# Patient Record
Sex: MALE | Race: BLACK OR AFRICAN AMERICAN | Employment: FULL TIME | URBAN - METROPOLITAN AREA
[De-identification: names, ages, dates, MRNs, and addresses within clinical notes are randomized per-mention and may not be internally consistent; named-entity substitution may affect disease eponyms.]

---

## 2018-04-05 ENCOUNTER — TELEPHONE (OUTPATIENT)
Dept: INTERNAL MEDICINE CLINIC | Age: 48
End: 2018-04-05

## 2018-04-05 NOTE — TELEPHONE ENCOUNTER
Called and spoke to the pt regarding fax sent over from [employer] requesting creams. He said he is requesting this he would like something to use for his carpal tunnel pain in both hands. Informed him I was not sure if MD would fill this as He has not been seen since 2015 for an ov.he states he has just been very busy working and planning his upcoming wedding. I got pt scheduled for a routine f/u apt for wed 4/18 @ 5:05pm forms have been placed in your folder on your desk for review.

## 2018-04-18 ENCOUNTER — OFFICE VISIT (OUTPATIENT)
Dept: INTERNAL MEDICINE CLINIC | Age: 48
End: 2018-04-18

## 2018-04-18 VITALS
HEART RATE: 78 BPM | WEIGHT: 149.6 LBS | TEMPERATURE: 97.9 F | OXYGEN SATURATION: 94 % | SYSTOLIC BLOOD PRESSURE: 115 MMHG | BODY MASS INDEX: 23.48 KG/M2 | HEIGHT: 67 IN | DIASTOLIC BLOOD PRESSURE: 81 MMHG | RESPIRATION RATE: 16 BRPM

## 2018-04-18 DIAGNOSIS — F33.1 MODERATE EPISODE OF RECURRENT MAJOR DEPRESSIVE DISORDER (HCC): Primary | ICD-10-CM

## 2018-04-18 DIAGNOSIS — Z12.5 SCREENING FOR PROSTATE CANCER: ICD-10-CM

## 2018-04-18 DIAGNOSIS — Z00.00 ROUTINE GENERAL MEDICAL EXAMINATION AT A HEALTH CARE FACILITY: ICD-10-CM

## 2018-04-18 DIAGNOSIS — R05.3 CHRONIC COUGH: ICD-10-CM

## 2018-04-18 DIAGNOSIS — M25.50 GENERALIZED JOINT PAIN: ICD-10-CM

## 2018-04-18 RX ORDER — MONTELUKAST SODIUM 10 MG/1
10 TABLET ORAL DAILY
Qty: 30 TAB | Refills: 3 | Status: SHIPPED | OUTPATIENT
Start: 2018-04-18 | End: 2019-03-28

## 2018-04-18 RX ORDER — ESCITALOPRAM OXALATE 10 MG/1
10 TABLET ORAL DAILY
Qty: 30 TAB | Refills: 1 | Status: SHIPPED | OUTPATIENT
Start: 2018-04-18 | End: 2018-06-15 | Stop reason: SDUPTHER

## 2018-04-18 RX ORDER — ACETAMINOPHEN 500 MG
500 TABLET ORAL
COMMUNITY
End: 2021-01-11

## 2018-04-18 NOTE — PROGRESS NOTES
HISTORY OF PRESENT ILLNESS  Matthias Noe is a 49 y.o. male. JUANCARLOS Lofton is seen today after a long absence for follow up of joint pain and depression with other concerns. 1.  Depression. This peaked last October with a suicide attempt. He locked himself in his garage with the automobile engine running. His fiancee who lives across the street from his shop noted something was amiss and kicked the door down. He suffered no sequelae from this incident and did not present for hospital evaluation. He still feels depressed but has had no further suicide attempts or suicidal ideation. The primary source of his depression is stress with his ex-wife which has led to not being able to see the children. This has been very difficult for him. We talked about medication treatment and he agrees to a trial of medication. 2.  Joint pain. I had received a request from a mail order pharmacy regarding compounded topical medications. I have asked him to check with his insurance before I send this as I am worried he would get stuck with the cost. He will let me know. 3.  Chronic sinus congestion. Reviewed use of Singulair. Social History: Notable for him being engaged to be . This has been a very bright point for him. Review of Systems   HENT: Positive for congestion. Respiratory: Positive for cough. Musculoskeletal: Positive for joint pain. Psychiatric/Behavioral: Positive for depression. Physical Exam   Constitutional: No distress. Cardiovascular: Normal rate and regular rhythm. Exam reveals no gallop and no friction rub. No murmur heard. Pulmonary/Chest: Effort normal and breath sounds normal.   Skin: Skin is warm and dry. Psychiatric: He has a normal mood and affect. His behavior is normal.   Nursing note and vitals reviewed. ASSESSMENT and PLAN  Diagnoses and all orders for this visit:    1.  Moderate episode of recurrent major depressive disorder (HCC)  - escitalopram oxalate (LEXAPRO) 10 mg tablet; Take 1 Tab by mouth daily. 2. Chronic cough  -     montelukast (SINGULAIR) 10 mg tablet; Take 1 Tab by mouth daily. Indications: chronic cough    3. Generalized joint pain- see hpi    4. Screening for prostate cancer  -     PSA SCREENING (SCREENING) ()    5.  Routine general medical examination at a health care facility  -     LIPID PANEL  -     TSH 3RD GENERATION  -     METABOLIC PANEL, COMPREHENSIVE  -     CBC WITH AUTOMATED DIFF  -     URINALYSIS W/ RFLX MICROSCOPIC

## 2018-04-18 NOTE — MR AVS SNAPSHOT
[address] [facility] [address] [place] [address] 
[phone] Patient: Leny Mirza MRN: [id number] :[id number] Visit Information Date & Time Provider Department Dept. Phone Encounter #  
 2018  5:05 PM Debby Xavier, [address] [place] [address] Internal Medicine [phone] [id number] Follow-up Instructions Return in about 1 month (around 2018). Upcoming Health Maintenance Date Due Influenza Age 5 to Adult 2018* DTaP/Tdap/Td series (2 - Td) 1/3/2022 COLONOSCOPY 2026 *Topic was postponed. The date shown is not the original due date. Allergies as of 2018  Review Complete On: 2018 By: Debby Xavier MD  
 No Known Allergies Current Immunizations  Reviewed on 2014 Name Date TDAP Vaccine 1/3/2012 Not reviewed this visit You Were Diagnosed With   
  
 Codes Comments Moderate episode of recurrent major depressive disorder ([facility]    -  Primary ICD-10-CM: F33.1 ICD-9-CM: 296.32 Chronic cough     ICD-10-CM: R05 ICD-9-CM: 786.2 Generalized joint pain     ICD-10-CM: M25.50 ICD-9-CM: 719.40 Screening for prostate cancer     ICD-10-CM: Z12.5 ICD-9-CM: V76.44 Routine general medical examination at a health care facility     ICD-10-CM: Z00.00 ICD-9-CM: V70.0 Vitals BP Pulse Temp Resp Height(growth percentile) Weight(growth percentile) 115/81 (BP 1 Location: Left arm, BP Patient Position: Sitting) 78 97.9 °F (36.6 °C) (Oral) 16 5' 7\" (1.702 m) 149 lb 9.6 oz (67.9 kg) SpO2 BMI Smoking Status 94% 23.43 kg/m2 Never Smoker BMI and BSA Data Body Mass Index Body Surface Area  
 23.43 kg/m 2 1.79 m 2 Preferred Pharmacy Pharmacy Name Phone [facility] [address] [facility], [facility] [address] [address] [address] [facility] [phone] Your Updated Medication List  
  
   
 This list is accurate as of 4/18/18  6:11 PM.  Always use your most recent med list.  
  
  
  
  
 dicyclomine 10 mg capsule Commonly known as:  BENTYL Take 1 Cap by mouth four (4) times daily as needed (abdominal pain). escitalopram oxalate 10 mg tablet Commonly known as:  [place] Aas Take 1 Tab by mouth daily. montelukast 10 mg tablet Commonly known as:  SINGULAIR Take 1 Tab by mouth daily. Indications: chronic cough  
  
 multivitamin tablet Commonly known as:  ONE A DAY Take 1 Tab by mouth daily. omeprazole 40 mg capsule Commonly known as:  PRILOSEC Take 1 Cap by mouth daily. sildenafil citrate 100 mg tablet Commonly known as:  VIAGRA Take 1 Tab by mouth as needed. TYLENOL EXTRA STRENGTH 500 mg tablet Generic drug:  acetaminophen Take  by mouth. Prescriptions Sent to Pharmacy Refills  
 escitalopram oxalate (LEXAPRO) 10 mg tablet 1 Sig: Take 1 Tab by mouth daily. Class: Normal  
 Pharmacy: [facility] [address] [facility] Ph #: [phone] Route: Oral  
 montelukast (SINGULAIR) 10 mg tablet 3 Sig: Take 1 Tab by mouth daily. Indications: chronic cough Class: Normal  
 Pharmacy: [facility] [address] [facility] Ph #: [phone] Route: Oral  
  
We Performed the Following CBC WITH AUTOMATED DIFF [04581 CPT(R)] LIPID PANEL [56679 CPT(R)] METABOLIC PANEL, COMPREHENSIVE [10167 CPT(R)] PSA SCREENING (SCREENING) [ [facility]] TSH 3RD GENERATION [24437 CPT(R)] URINALYSIS W/ RFLX MICROSCOPIC [81976 CPT(R)] Follow-up Instructions Return in about 1 month (around 5/18/2018). Introducing [facility]!    
 [employer] introduces [employer] patient portal. Now you can access parts of your medical record, email your doctor's office, and request medication refills online. 1. In your internet browser, go to [email or web address]. [employer] [employer] 2. Click on the First Time User? Click Here link in the Sign In box. You will see the New Member Sign Up page. 3. Enter your [employer] Access Code exactly as it appears below. You will not need to use this code after youve completed the sign-up process. If you do not sign up before the expiration date, you must request a new code. · [employer] Access Code: [email or web address] Expires: 7/17/2018  5:15 PM 
 
4. Enter the last four digits of your Social Security Number (xxxx) and Date of Birth (mm/dd/yyyy) as indicated and click Submit. You will be taken to the next sign-up page. 5. Create a [employer]ix ID. This will be your [employer] login ID and cannot be changed, so think of one that is secure and easy to remember. 6. Create a [employer] password. You can change your password at any time. 7. Enter your Password Reset Question and Answer. This can be used at a later time if you forget your password. 8. Enter your e-mail address. You will receive e-mail notification when new information is available in [address] 9. Click Sign Up. You can now view and download portions of your medical record. 10. Click the Download Summary menu link to download a portable copy of your medical information. If you have questions, please visit the Frequently Asked Questions section of the [employer] website. Remember, [employer] is NOT to be used for urgent needs. For medical emergencies, dial 911. Now available from your iPhone and Android! Please provide this summary of care documentation to your next provider. Your primary care clinician is listed as DIPIKA SORIANO. If you have any questions after today's visit, please call [phone].

## 2018-05-15 LAB
ALBUMIN SERPL-MCNC: 4.7 G/DL (ref 3.5–5.5)
ALBUMIN/GLOB SERPL: 2 {RATIO} (ref 1.2–2.2)
ALP SERPL-CCNC: 55 IU/L (ref 39–117)
ALT SERPL-CCNC: 21 IU/L (ref 0–44)
APPEARANCE UR: CLEAR
AST SERPL-CCNC: 24 IU/L (ref 0–40)
BASOPHILS # BLD AUTO: 0 X10E3/UL (ref 0–0.2)
BASOPHILS NFR BLD AUTO: 0 %
BILIRUB SERPL-MCNC: 0.4 MG/DL (ref 0–1.2)
BILIRUB UR QL STRIP: NEGATIVE
BUN SERPL-MCNC: 15 MG/DL (ref 6–24)
BUN/CREAT SERPL: 13 (ref 9–20)
CALCIUM SERPL-MCNC: 9 MG/DL (ref 8.7–10.2)
CHLORIDE SERPL-SCNC: 106 MMOL/L (ref 96–106)
CHOLEST SERPL-MCNC: 193 MG/DL (ref 100–199)
CO2 SERPL-SCNC: 25 MMOL/L (ref 18–29)
COLOR UR: YELLOW
CREAT SERPL-MCNC: 1.17 MG/DL (ref 0.76–1.27)
EOSINOPHIL # BLD AUTO: 0.1 X10E3/UL (ref 0–0.4)
EOSINOPHIL NFR BLD AUTO: 3 %
ERYTHROCYTE [DISTWIDTH] IN BLOOD BY AUTOMATED COUNT: 15 % (ref 12.3–15.4)
GFR SERPLBLD CREATININE-BSD FMLA CKD-EPI: 73 ML/MIN/1.73
GFR SERPLBLD CREATININE-BSD FMLA CKD-EPI: 85 ML/MIN/1.73
GLOBULIN SER CALC-MCNC: 2.3 G/DL (ref 1.5–4.5)
GLUCOSE SERPL-MCNC: 100 MG/DL (ref 65–99)
GLUCOSE UR QL: NEGATIVE
HCT VFR BLD AUTO: 41.6 % (ref 37.5–51)
HDLC SERPL-MCNC: 74 MG/DL
HGB BLD-MCNC: 14.1 G/DL (ref 13–17.7)
HGB UR QL STRIP: NEGATIVE
IMM GRANULOCYTES # BLD: 0 X10E3/UL (ref 0–0.1)
IMM GRANULOCYTES NFR BLD: 0 %
KETONES UR QL STRIP: NEGATIVE
LDLC SERPL CALC-MCNC: 107 MG/DL (ref 0–99)
LEUKOCYTE ESTERASE UR QL STRIP: NEGATIVE
LYMPHOCYTES # BLD AUTO: 1.5 X10E3/UL (ref 0.7–3.1)
LYMPHOCYTES NFR BLD AUTO: 44 %
MCH RBC QN AUTO: 29.1 PG (ref 26.6–33)
MCHC RBC AUTO-ENTMCNC: 33.9 G/DL (ref 31.5–35.7)
MCV RBC AUTO: 86 FL (ref 79–97)
MICRO URNS: NORMAL
MONOCYTES # BLD AUTO: 0.3 X10E3/UL (ref 0.1–0.9)
MONOCYTES NFR BLD AUTO: 8 %
NEUTROPHILS # BLD AUTO: 1.5 X10E3/UL (ref 1.4–7)
NEUTROPHILS NFR BLD AUTO: 45 %
NITRITE UR QL STRIP: NEGATIVE
PH UR STRIP: 6.5 [PH] (ref 5–7.5)
PLATELET # BLD AUTO: 204 X10E3/UL (ref 150–379)
POTASSIUM SERPL-SCNC: 4.8 MMOL/L (ref 3.5–5.2)
PROT SERPL-MCNC: 7 G/DL (ref 6–8.5)
PROT UR QL STRIP: NEGATIVE
PSA SERPL-MCNC: 0.5 NG/ML (ref 0–4)
RBC # BLD AUTO: 4.85 X10E6/UL (ref 4.14–5.8)
SODIUM SERPL-SCNC: 143 MMOL/L (ref 134–144)
SP GR UR: 1.02 (ref 1–1.03)
TRIGL SERPL-MCNC: 61 MG/DL (ref 0–149)
TSH SERPL DL<=0.005 MIU/L-ACNC: 1.03 UIU/ML (ref 0.45–4.5)
UROBILINOGEN UR STRIP-MCNC: 0.2 MG/DL (ref 0.2–1)
VLDLC SERPL CALC-MCNC: 12 MG/DL (ref 5–40)
WBC # BLD AUTO: 3.4 X10E3/UL (ref 3.4–10.8)

## 2018-05-15 NOTE — PROGRESS NOTES
[facility] eBto spoke with KYLE crawford on A1C - dx IFG (R73.01).

## 2018-05-16 LAB
HBA1C MFR BLD: 5.7 % (ref 4.8–5.6)
SPECIMEN STATUS REPORT, ROLRST: NORMAL

## 2019-03-18 ENCOUNTER — TELEPHONE (OUTPATIENT)
Dept: INTERNAL MEDICINE CLINIC | Age: 49
End: 2019-03-18

## 2019-03-18 NOTE — TELEPHONE ENCOUNTER
Pt was told by Dr Margarito Judge -to have the ColoGurard done -   Pt needs Dr to order it for him to do at home.   Advise - [phone]

## 2019-03-19 ENCOUNTER — TELEPHONE (OUTPATIENT)
Dept: INTERNAL MEDICINE CLINIC | Age: 49
End: 2019-03-19

## 2019-03-19 NOTE — TELEPHONE ENCOUNTER
Noted pt dropped off schedule today. Spoke with Alley Ravi who took call from wife - stated pt was admitted to hospital. Asked where? She did not ask. Called pt's number again to see where he was admitted. His wife Matt Link answered. She states pt went to [facility] yesterday. Was admitted for possible ulcer. MD feels he may have a bleeding ulcer. He is scheduled for scope today.  Will forward to MD.

## 2019-03-19 NOTE — TELEPHONE ENCOUNTER
Attempted to call pt left message that he was to go to ER yesterday. I did [facility] see [facility] He was scheduled for appt today at 9:20 am. Also attempted to call pt's wife Holly Jameson and received message caller was not accepting calls at this time.  Will forward to MD.

## 2019-03-28 ENCOUNTER — HOSPITAL ENCOUNTER (OUTPATIENT)
Dept: GENERAL RADIOLOGY | Age: 49
Discharge: HOME | End: 2019-03-28
Payer: COMMERCIAL

## 2019-03-28 ENCOUNTER — OFFICE VISIT (OUTPATIENT)
Dept: INTERNAL MEDICINE CLINIC | Age: 49
End: 2019-03-28

## 2019-03-28 VITALS
BODY MASS INDEX: 22.48 KG/M2 | HEART RATE: 95 BPM | HEIGHT: 67 IN | DIASTOLIC BLOOD PRESSURE: 86 MMHG | TEMPERATURE: 98.5 F | SYSTOLIC BLOOD PRESSURE: 124 MMHG | WEIGHT: 143.25 LBS | OXYGEN SATURATION: 98 % | RESPIRATION RATE: 18 BRPM

## 2019-03-28 DIAGNOSIS — I26.99 OTHER ACUTE PULMONARY EMBOLISM WITHOUT ACUTE COR PULMONALE (HCC): ICD-10-CM

## 2019-03-28 DIAGNOSIS — R05.9 COUGH: ICD-10-CM

## 2019-03-28 DIAGNOSIS — T14.91XA SUICIDE ATTEMPT (HCC): Primary | ICD-10-CM

## 2019-03-28 DIAGNOSIS — K92.2 GASTROINTESTINAL HEMORRHAGE, UNSPECIFIED GASTROINTESTINAL HEMORRHAGE TYPE: ICD-10-CM

## 2019-03-28 DIAGNOSIS — F33.2 SEVERE EPISODE OF RECURRENT MAJOR DEPRESSIVE DISORDER, WITHOUT PSYCHOTIC FEATURES (HCC): ICD-10-CM

## 2019-03-28 LAB
INR BLD: 1.1
PT POC: 13.1 SECONDS
VALID INTERNAL CONTROL?: YES

## 2019-03-28 PROCEDURE — 71046 X-RAY EXAM CHEST 2 VIEWS: CPT

## 2019-03-28 RX ORDER — LANOLIN ALCOHOL/MO/W.PET/CERES
325 CREAM (GRAM) TOPICAL
COMMUNITY
Start: 2019-03-21 | End: 2019-04-30 | Stop reason: SDUPTHER

## 2019-03-28 RX ORDER — PANTOPRAZOLE SODIUM 40 MG/1
TABLET, DELAYED RELEASE ORAL
Refills: 1 | COMMUNITY
Start: 2019-03-21 | End: 2019-06-26 | Stop reason: SDUPTHER

## 2019-03-28 RX ORDER — ENOXAPARIN SODIUM 100 MG/ML
60 INJECTION SUBCUTANEOUS EVERY 12 HOURS
Qty: 14 SYRINGE | Refills: 5 | Status: SHIPPED | OUTPATIENT
Start: 2019-03-28 | End: 2019-04-09 | Stop reason: ALTCHOICE

## 2019-03-28 RX ORDER — WARFARIN SODIUM 5 MG/1
TABLET ORAL
Refills: 0 | COMMUNITY
Start: 2019-03-21 | End: 2019-04-04 | Stop reason: SDUPTHER

## 2019-03-28 RX ORDER — MIRTAZAPINE 15 MG/1
TABLET, FILM COATED ORAL
Refills: 0 | COMMUNITY
Start: 2019-03-14 | End: 2019-04-04 | Stop reason: SDUPTHER

## 2019-03-28 NOTE — PROGRESS NOTES
HISTORY OF PRESENT ILLNESS Jhony Saldaña is a 51 y.o. male. JUANCARLOS Sales is seen today accompanied by his wife for followup of recent hospitalizations x3. This is not a transition of care visit given absence of nurse navigator contact as well as lack of coverage of this service by his insurance. He was admitted to [facility] on 3/9/19 with an overdose attempt. He was treated in the ICU and then transferred for inpatient psychiatry there. He was readmitted to [facility] on 3/14/19 through 3/16/19 with right chest pain and flank pain. He was diagnosed with a pulmonary embolism. He was started on Eliquis. On 3/18/19, he developed signs of GI bleeding and was referred back to the hospital.  He went [place] [facility] [place] time. We requested and reviewed the available records. He was discharged on 3/21/19. He did not have a colonoscopy and has actually had this performed in the fairly recent history. He did have an upper endoscopy. He was changed to Protonix and Warfarin. 1. Depression. He has psychiatry followup scheduled. They would like counseling as well. I think this is appropriate. Names were provided and they will check with counselors associated with his psychiatry referral.   
2. PE. Warfarin treatment is undertaken today with our Warfarin clinic nurse, Cyndee. He will return in one week for a recheck. He is on Lovenox as well which was reordered as his INR is quite subtherapeutic today. We could not find a discharge INR from [facility]. 3. GI bleeding, agree with Protonix. Strict avoidance of NSAIDs is encouraged. Social History:  Notable for him requiring short-term disability forms to be completed. It turns out the forms he has today are all for his employer. Reviewed that he should complete these, return to his employer and paperwork for me will be advanced at a later date I am certain. Surgical History:  Reviewed for history of hemorrhoid banding. Review of Systems:  Notable for a plethora of other symptoms. He continues to feel depressed. He has some right posterior headaches consistent with muscle contraction that comes and goes. Yesterday, he had 6/10 rectal pain with the sensation of having to move his bowels. This has resolved itself. Lastly, he has some left lower extremity weakness which I suspect is due to deconditioning and he has a dry cough. We will obtain a chest x-ray given recent significant pulmonary history. This was an extended visit of high complexity. Review of Systems Constitutional: Positive for malaise/fatigue. Negative for chills and fever. Respiratory: Positive for cough. Negative for sputum production. Cardiovascular: Negative for leg swelling. Gastrointestinal: Positive for abdominal pain, blood in stool and melena. Negative for vomiting. Musculoskeletal: Positive for myalgias. Endo/Heme/Allergies: Bruises/bleeds easily. Psychiatric/Behavioral: Positive for depression. All other systems reviewed and are negative. Physical Exam  
Constitutional: No distress. Cardiovascular: Normal rate and regular rhythm. Exam reveals no gallop and no friction rub. No murmur heard. Pulmonary/Chest: Effort normal and breath sounds normal.  
Abdominal: Soft. He exhibits no distension. There is no hepatosplenomegaly. There is no tenderness. There is no rigidity, no rebound and no guarding. Nursing note and vitals reviewed. ASSESSMENT and PLAN Diagnoses and all orders for this visit: 1. Suicide attempt [facility])- Proceed with plan as discussed 2. Other acute pulmonary embolism without acute cor pulmonale ([facility] -     METABOLIC PANEL, COMPREHENSIVE 
-     CBC WITH AUTOMATED DIFF 
-     XR CHEST PA LAT; Future 
-     REFERRAL TO PSYCHOLOGY 
-     enoxaparin (LOVENOX) 60 mg/0.6 mL injection; 60 mg by SubCUTAneous route every twelve (12) hours.  
-     AMB POC PT/INR 
 
 3. Gastrointestinal hemorrhage, unspecified gastrointestinal hemorrhage type -     METABOLIC PANEL, COMPREHENSIVE 
-     CBC WITH AUTOMATED DIFF 4. Severe episode of recurrent major depressive disorder, without psychotic features [facility])- See psychiatrist as directed , See psychologist as discussed/directed 5. Cough -     XR CHEST PA LAT; Future Plan was reviewed with patient and family, understanding expressed

## 2019-03-28 NOTE — PATIENT INSTRUCTIONS
INR 1.1 today. START NEW DOSE below today - see calendar Old dose: Coumadin 5mg daily NEW dose: Coumadin 5mg daily except 10mg on Thursday and Friday Continue Lovenox 60mg injections every 12 hours Coumadin/Warfarin/Jantoven (are all the same medication) INR level goal 2.0-3.0  (below 2.0 too thick, above 3.0 too thin) Always please make sure to let us know about any medication changes or unusual bleeding/bruising. Additionally, please let us know about any upcoming medical procedures you may have scheduled. Keep diet consistent. Don't hesitate to contact us with any questions or concerns at [phone].

## 2019-03-29 LAB
ALBUMIN SERPL-MCNC: 3.8 G/DL (ref 3.5–5.5)
ALBUMIN/GLOB SERPL: 1.3 {RATIO} (ref 1.2–2.2)
ALP SERPL-CCNC: 73 IU/L (ref 39–117)
ALT SERPL-CCNC: 141 IU/L (ref 0–44)
AST SERPL-CCNC: 110 IU/L (ref 0–40)
BASOPHILS # BLD AUTO: 0 X10E3/UL (ref 0–0.2)
BASOPHILS NFR BLD AUTO: 0 %
BILIRUB SERPL-MCNC: <0.2 MG/DL (ref 0–1.2)
BUN SERPL-MCNC: 18 MG/DL (ref 6–24)
BUN/CREAT SERPL: 18 (ref 9–20)
CALCIUM SERPL-MCNC: 9.3 MG/DL (ref 8.7–10.2)
CHLORIDE SERPL-SCNC: 106 MMOL/L (ref 96–106)
CO2 SERPL-SCNC: 25 MMOL/L (ref 20–29)
CREAT SERPL-MCNC: 1.01 MG/DL (ref 0.76–1.27)
EOSINOPHIL # BLD AUTO: 0.2 X10E3/UL (ref 0–0.4)
EOSINOPHIL NFR BLD AUTO: 3 %
ERYTHROCYTE [DISTWIDTH] IN BLOOD BY AUTOMATED COUNT: 15.7 % (ref 12.3–15.4)
GLOBULIN SER CALC-MCNC: 3 G/DL (ref 1.5–4.5)
GLUCOSE SERPL-MCNC: 105 MG/DL (ref 65–99)
HCT VFR BLD AUTO: 30.1 % (ref 37.5–51)
HGB BLD-MCNC: 9.4 G/DL (ref 13–17.7)
IMM GRANULOCYTES # BLD AUTO: 0.1 X10E3/UL (ref 0–0.1)
IMM GRANULOCYTES NFR BLD AUTO: 1 %
LYMPHOCYTES # BLD AUTO: 1.9 X10E3/UL (ref 0.7–3.1)
LYMPHOCYTES NFR BLD AUTO: 33 %
MCH RBC QN AUTO: 27.6 PG (ref 26.6–33)
MCHC RBC AUTO-ENTMCNC: 31.2 G/DL (ref 31.5–35.7)
MCV RBC AUTO: 89 FL (ref 79–97)
MONOCYTES # BLD AUTO: 0.4 X10E3/UL (ref 0.1–0.9)
MONOCYTES NFR BLD AUTO: 8 %
NEUTROPHILS # BLD AUTO: 3.2 X10E3/UL (ref 1.4–7)
NEUTROPHILS NFR BLD AUTO: 55 %
PLATELET # BLD AUTO: 488 X10E3/UL (ref 150–379)
POTASSIUM SERPL-SCNC: 4.5 MMOL/L (ref 3.5–5.2)
PROT SERPL-MCNC: 6.8 G/DL (ref 6–8.5)
RBC # BLD AUTO: 3.4 X10E6/UL (ref 4.14–5.8)
SODIUM SERPL-SCNC: 145 MMOL/L (ref 134–144)
WBC # BLD AUTO: 5.8 X10E3/UL (ref 3.4–10.8)

## 2019-03-29 NOTE — PROGRESS NOTES
Advised pt and pt's wife Kandice Saenz (on HIPAA) that pt's labs look great overall except for 2 things: 1- blood count is still low but stable - needs to start iron sulfate 325 mg every day can get OTC. She states pt has been on iron supplement since he was admitted to [facility]. 2- liver tests show inflammation. Confirmed with pt and Constance that he is abstaining from alcohol. Advised them this would worsen this problem as well as very dangerous with him taking Coumadin. 3- cxr is fine. Will forward back to MD in regards to iron supplement.

## 2019-03-29 NOTE — PROGRESS NOTES
Call-Labs look great overall except for 2 things 1- blood count is still low, but stable- start iron sulfate 325 mg every day , OTC 2- liver tests show inflammation. Confirm he's abstaining from alcohol as this would worsen this problem as well as very dangerous with Coumadin 3- cxr is fine

## 2019-04-01 ENCOUNTER — ANTI-COAG VISIT (OUTPATIENT)
Dept: INTERNAL MEDICINE CLINIC | Age: 49
End: 2019-04-01

## 2019-04-01 DIAGNOSIS — I26.99 ACUTE PULMONARY EMBOLISM WITHOUT ACUTE COR PULMONALE, UNSPECIFIED PULMONARY EMBOLISM TYPE (HCC): Primary | ICD-10-CM

## 2019-04-01 LAB
INR BLD: 1.4
PT POC: 16.2 SECONDS
VALID INTERNAL CONTROL?: YES

## 2019-04-01 NOTE — PATIENT INSTRUCTIONS
INR 1.4 today NEW dose: Coumadin 5mg daily except 10mg on M/W/F Continue Lovenox 60mg every 12 hours Recheck INR this Thursday 4/4/19 at 9:15 INR level goal 2.0-3.0  (below 2.0 too thick, above 3.0 too thin) Always please make sure to let us know about any medication changes or unusual bleeding/bruising. Additionally, please let us know about any upcoming medical procedures you may have scheduled. Keep diet consistent. Don't hesitate to contact us with any questions or concerns at [phone].

## 2019-04-01 NOTE — Clinical Note
I put him on as an actual office visit with you Thursday. They have appt with psychiatry on 4/12 but pt needs refill of remeron (has enough to get through to appt with you on Thursday)  Hoping you would not mind giving him 1 refill on that to get through til psych appt. He has appt with [facility] next week. Thanks, I hope this is ok.  A

## 2019-04-04 ENCOUNTER — OFFICE VISIT (OUTPATIENT)
Dept: INTERNAL MEDICINE CLINIC | Age: 49
End: 2019-04-04

## 2019-04-04 VITALS
BODY MASS INDEX: 23.39 KG/M2 | SYSTOLIC BLOOD PRESSURE: 116 MMHG | TEMPERATURE: 98.1 F | RESPIRATION RATE: 16 BRPM | DIASTOLIC BLOOD PRESSURE: 74 MMHG | WEIGHT: 149 LBS | HEIGHT: 67 IN | HEART RATE: 74 BPM | OXYGEN SATURATION: 99 %

## 2019-04-04 DIAGNOSIS — I26.99 ACUTE PULMONARY EMBOLISM WITHOUT ACUTE COR PULMONALE, UNSPECIFIED PULMONARY EMBOLISM TYPE (HCC): Primary | ICD-10-CM

## 2019-04-04 DIAGNOSIS — T14.91XA SUICIDE ATTEMPT (HCC): ICD-10-CM

## 2019-04-04 LAB
INR BLD: 1.7
PT POC: 20.4 SECONDS
VALID INTERNAL CONTROL?: YES

## 2019-04-04 RX ORDER — WARFARIN 10 MG/1
TABLET ORAL
Qty: 30 TAB | Refills: 0 | Status: SHIPPED | OUTPATIENT
Start: 2019-04-04 | End: 2019-04-09 | Stop reason: DRUGHIGH

## 2019-04-04 RX ORDER — MIRTAZAPINE 15 MG/1
TABLET, FILM COATED ORAL
Qty: 10 TAB | Refills: 0 | Status: SHIPPED | OUTPATIENT
Start: 2019-04-04 | End: 2019-06-26 | Stop reason: SDUPTHER

## 2019-04-04 NOTE — PROGRESS NOTES
Karoline  is a 51 y.o. male who was seen in clinic today (4/4/2019). Assessment & Plan:  
 
Diagnoses and all orders for this visit: 
 
1. Acute pulmonary embolism without acute cor pulmonale, unspecified pulmonary embolism type ([facility]- asymptomatic, INR still low (1.7), will increase weekly dose from 50mg to 60mg. Reviewed vitamin K diet. They were asking about starting a vitamix drink/shake, recommended against it and explained rational. Continue w/ Lovenox. Will RTC next week for INR check in coumadin clinic. 
-     AMB POC PT/INR 
-     warfarin (COUMADIN) 10 mg tablet; TAKE ONE TABLET BY MOUTH MONDAY THRU FRIDAY, 1/2 TABLET ON SATURDAY & SUNDAY 2. Suicide attempt [facility])- has f/u next week, taking meds as directed & w/o side effects, will refill until he can get into see specialist. 
-     mirtazapine (REMERON) 15 mg tablet; TK 1 T PO QHS FOR DEPRESSION Follow-up and Dispositions · Return in about 5 days (around 4/9/2019) for INR check. Subjective: Anupama Holman was seen today for Depression and Anticoagulation Anticoagulation Patient here for followup of chronic anticoagulation due to PE. Duration: Time; 3 months to 1 year: life long. Bleeding Signs/Symptoms: None. Thromboembolic Signs/Symptoms: None. Recent falls:  None. Following a consistent vitamin K diet: yes. INR have been improving. Reports current dose of coumadin is: 10mg on M/W/F and 5mg rest of the week. Lab Results Component Value Date/Time INR POC 1.7 04/04/2019 09:13 AM  
 INR POC 1.4 04/01/2019 10:08 AM  
 INR POC 1.1 03/28/2019 11:10 AM  
  
 
 
Brief Labs:  
 
Lab Results Component Value Date/Time  Sodium 145 03/28/2019 12:00 AM  
 Potassium 4.5 03/28/2019 12:00 AM  
 Creatinine 1.01 03/28/2019 12:00 AM  
 Cholesterol, total 193 05/14/2018 10:54 AM  
 HDL Cholesterol 74 05/14/2018 10:54 AM  
 LDL, calculated 107 05/14/2018 10:54 AM  
 Triglyceride 61 05/14/2018 10:54 AM  
 Hemoglobin A1c 5.7 05/14/2018 10:54 AM  
 TSH 1.030 05/14/2018 10:54 AM  
  
 
 
Prior to Admission medications Medication Sig Start Date End Date Taking? Authorizing Provider  
ferrous sulfate 325 mg (65 mg iron) tablet Take 325 mg by mouth. 3/21/19  Yes Provider, Historical  
mirtazapine (REMERON) 15 mg tablet TK 1 T PO QHS FOR DEPRESSION 3/14/19  Yes Provider, Historical  
pantoprazole (PROTONIX) 40 mg tablet TAKE ONE TABLET BY MOUTH TWICE A DAY BEFORE BREAKFAST AND DINNER 3/21/19  Yes Provider, Historical  
warfarin (COUMADIN) 5 mg tablet TAKE ONE TABLET BY MOUTH EVERY, EXCEPT 2 tabs on M/W/F 3/21/19  Yes Provider, Historical  
enoxaparin (LOVENOX) 60 mg/0.6 mL injection 60 mg by SubCUTAneous route every twelve (12) hours. 3/28/19  Yes Sarita Craft MD  
acetaminophen (TYLENOL EXTRA STRENGTH) 500 mg tablet Take  by mouth. Yes Provider, Historical  
multivitamin (ONE A DAY) tablet Take 1 Tab by mouth daily. Yes Provider, Historical  
  
 
 
No Known Allergies Review of Systems Constitutional: Negative for malaise/fatigue and weight loss. HENT: Negative for nosebleeds. Respiratory: Negative for hemoptysis. Gastrointestinal: Negative for abdominal pain, blood in stool, heartburn, melena, nausea and vomiting. Genitourinary: Negative for hematuria. Endo/Heme/Allergies: Does not bruise/bleed easily. Objective:  
Physical Exam  
Cardiovascular: Regular rhythm and normal heart sounds. No murmur heard. Pulmonary/Chest: Effort normal and breath sounds normal. He has no wheezes. He has no rales. Abdominal: Soft. Bowel sounds are normal. He exhibits no mass. There is no hepatosplenomegaly. There is no tenderness. Visit Vitals /74 Pulse 74 Temp 98.1 °F (36.7 °C) (Oral) Resp 16 Ht 5' 7\" (1.702 m) Wt 149 lb (67.6 kg) SpO2 99% BMI 23.34 kg/m² Disclaimer: 
Advised him to call back or return to office if symptoms worsen/change/persist. 
 Discussed expected course/resolution/complications of diagnosis in detail with patient. Medication risks/benefits/costs/interactions/alternatives discussed with patient. He was given an after visit summary which includes diagnoses, current medications, & vitals. He expressed understanding with the diagnosis and plan. Aspects of this note may have been generated using voice recognition software. Despite editing, there may be some syntax errors.   
 
 
Silvia Lopez MD

## 2019-04-04 NOTE — PATIENT INSTRUCTIONS
Consistent Vitamin K Diet: Care Instructions Your Care Instructions Your body needs vitamin K to clot blood and keep your bones strong. It's found in leafy green vegetables such as kale and spinach. If you take the blood thinner warfarin (Coumadin), you need to be careful about how much vitamin K you get. Vitamin K can keep your warfarin from working as it should. Most people who take warfarin can eat normally. The important thing is to get about the same amount of vitamin K each day. Don't suddenly start eating foods with a lot more or a lot less vitamin K. You can choose how much vitamin K you eat. For example, if you already eat a lot of leafy green vegetables, that's fine. Just keep it about the same amount each day. Follow-up care is a key part of your treatment and safety. Be sure to make and go to all appointments, and call your doctor if you are having problems. It's also a good idea to know your test results and keep a list of the medicines you take. How can you care for yourself at home? You don't need to stop eating food high in vitamin K. But you do need to know what foods contain vitamin K. Then you can try to eat about the same amount of vitamin K each day. · You might limit foods that are high in vitamin K to about 1 serving a day. These foods have about 250 to 500 micrograms (mcg) of vitamin K in each serving. They include: 
? Cooked leafy green vegetables. Examples are kale, spinach, turnip greens, bailee greens, Swiss chard, and mustard greens. One serving is ½ cup. · You might limit foods that are medium-high in vitamin K to about 3 servings a day. These foods have about 50 to 150 mcg of vitamin K in each serving. These include: 
? Cooked brussels sprouts, broccoli, cabbage, and asparagus. One serving is ½ cup. 
? Raw leafy green vegetables. Examples are spinach, green leaf lettuce, delaney lettuce, and endive. One serving is 1 cup. · Vitamin K also is found in many multivitamins. You don't need to stop taking your multivitamin if it has vitamin K. But you do need to take it every day. · Check with your doctor before you start or stop taking any supplements or herbal products. Some of these may contain vitamin K. Where can you learn more? Go to http://dennis-leslye.info/. Enter T056 in the search box to learn more about \"Consistent Vitamin K Diet: Care Instructions. \" Current as of: July 22, 2018 Content Version: 11.9 © 1104-1950 [employer]. Care instructions adapted under license by [employer] (which disclaims liability or warranty for this information). If you have questions about a medical condition or this instruction, always ask your healthcare professional. [address] any warranty or liability for your use of this information.

## 2019-04-09 ENCOUNTER — ANTI-COAG VISIT (OUTPATIENT)
Dept: INTERNAL MEDICINE CLINIC | Age: 49
End: 2019-04-09

## 2019-04-09 DIAGNOSIS — I26.99 ACUTE PULMONARY EMBOLISM WITHOUT ACUTE COR PULMONALE, UNSPECIFIED PULMONARY EMBOLISM TYPE (HCC): Primary | ICD-10-CM

## 2019-04-09 LAB
INR BLD: 3.4
PT POC: 41.2 SECONDS
VALID INTERNAL CONTROL?: YES

## 2019-04-09 RX ORDER — WARFARIN SODIUM 5 MG/1
5 TABLET ORAL DAILY
COMMUNITY
End: 2019-04-09 | Stop reason: SDUPTHER

## 2019-04-09 RX ORDER — WARFARIN SODIUM 5 MG/1
5 TABLET ORAL DAILY
Qty: 130 TAB | Refills: 0 | Status: SHIPPED | OUTPATIENT
Start: 2019-04-09 | End: 2019-04-16 | Stop reason: DRUGHIGH

## 2019-04-09 NOTE — PROGRESS NOTES
INR 3.4 today. Patient instructions Tay Hannah Vazquez: NEW dose: Coumadin 5 mg daily except 10 mg on Thu/Sat/Sun Discontinue Lovenox injections Recheck at Thursday's appt with Dr. Bryan Vazquez and then next Tuesday in Coumadin clinic A calendar with daily Coumadin dosage instructions has been given to patient at the end of today's visit with written directions to stop Lovenox injections. Patient denies any unusual bleeding or bruising and was instructed to call office and seek medical care if any should occur. A full discussion of the nature of anticoagulants has been carried out. A benefit risk analysis has been presented to the patient, so that they understand the justification for choosing anticoagulation at this time. The need for frequent and regular monitoring, precise dosage adjustment and compliance is stressed. Side effects of potential bleeding are discussed. The patient should avoid any OTC items containing aspirin or ibuprofen, and should avoid great swings in general diet. Avoid alcohol consumption. Call if any signs of abnormal bleeding. Patient verbalized understanding.

## 2019-04-11 ENCOUNTER — OFFICE VISIT (OUTPATIENT)
Dept: INTERNAL MEDICINE CLINIC | Age: 49
End: 2019-04-11

## 2019-04-11 VITALS
RESPIRATION RATE: 18 BRPM | DIASTOLIC BLOOD PRESSURE: 72 MMHG | OXYGEN SATURATION: 96 % | HEART RATE: 80 BPM | SYSTOLIC BLOOD PRESSURE: 110 MMHG | HEIGHT: 67 IN | WEIGHT: 149 LBS | BODY MASS INDEX: 23.39 KG/M2 | TEMPERATURE: 98.2 F

## 2019-04-11 DIAGNOSIS — F33.2 SEVERE EPISODE OF RECURRENT MAJOR DEPRESSIVE DISORDER, WITHOUT PSYCHOTIC FEATURES (HCC): ICD-10-CM

## 2019-04-11 DIAGNOSIS — I26.99 OTHER ACUTE PULMONARY EMBOLISM WITHOUT ACUTE COR PULMONALE (HCC): ICD-10-CM

## 2019-04-11 DIAGNOSIS — K92.2 GASTROINTESTINAL HEMORRHAGE, UNSPECIFIED GASTROINTESTINAL HEMORRHAGE TYPE: ICD-10-CM

## 2019-04-11 DIAGNOSIS — T14.91XA SUICIDE ATTEMPT (HCC): ICD-10-CM

## 2019-04-11 DIAGNOSIS — I26.99 ACUTE PULMONARY EMBOLISM WITHOUT ACUTE COR PULMONALE, UNSPECIFIED PULMONARY EMBOLISM TYPE (HCC): Primary | ICD-10-CM

## 2019-04-11 LAB
INR BLD: 2.7
PT POC: 32.3 SECONDS
VALID INTERNAL CONTROL?: YES

## 2019-04-11 NOTE — PROGRESS NOTES
INR 2.7 today. Patient instructions:  
Coumadin 5 mg daily except 10 mg on Thu/Sat/Sun Recheck Tuesday in Coumadin clinic A full discussion of the nature of anticoagulants has been carried out. A benefit risk analysis has been presented to the patient, so that they understand the justification for choosing anticoagulation at this time. The need for frequent and regular monitoring, precise dosage adjustment and compliance is stressed. Side effects of potential bleeding are discussed. The patient should avoid any OTC items containing aspirin or ibuprofen, and should avoid great swings in general diet. Avoid alcohol consumption. Call if any signs of abnormal bleeding. Patient verbalized understanding.

## 2019-04-11 NOTE — PROGRESS NOTES
HISTORY OF PRESENT ILLNESS Aida Kaufman is a 51 y.o. male. HPI Subjective:  Vida Boss is seen today for follow up of pulmonary embolism and other medical problems. His wife was unable to be present today. He is doing better. 1. Pulmonary embolism. INR is obtained. Reviewed the regimen with our Coumadin nurse, Cyndee. Full instructions will be given to Vida Bsos and she is also going to reiterate these with his wife via telephone. 2. GI bleeding, resolved. 3. Depression with suicide attempt. He reports very mild alcohol intake only leading up to this. He is not drinking at all now. Psychiatry consultation is pending for April 12th. Review of Systems:  Notable for 7/10 leg pain. This tends to come and go. He also has myalgias. I think a lot of this is due to his multiple hospitalizations leading to severe deconditioning. Will follow this for now. Social History:  Notable for him being  for one year. Reviewed his disability status. Forms will be completed. He works as a  at [employer] He feels he could return to work next week so we will state his return to work date as 04/15. Family History:  Negative for any previous history of PE. This is an extended visit of high complexity. Review of Systems Constitutional: Negative for weight loss. Respiratory: Negative. Cardiovascular: Negative for chest pain, palpitations, leg swelling and PND. Musculoskeletal: Positive for joint pain and myalgias. Neurological: Negative for focal weakness. Endo/Heme/Allergies: Does not bruise/bleed easily. Psychiatric/Behavioral: Positive for depression. Negative for suicidal ideas. All other systems reviewed and are negative. Physical Exam  
Constitutional: No distress. Cardiovascular: Normal rate and regular rhythm. Exam reveals no gallop and no friction rub. No murmur heard.  
Pulmonary/Chest: Effort normal and breath sounds normal.  
 Musculoskeletal: He exhibits no edema. Nursing note and vitals reviewed. ASSESSMENT and PLAN Diagnoses and all orders for this visit: 
 
1. Acute pulmonary embolism without acute cor pulmonale, unspecified pulmonary embolism type ([facility] [address] -     AMB POC PT/INR, Please follow inr protocol . High risk med management 2. Suicide attempt [facility])- See psychiatrist as directed 3. Other acute pulmonary embolism without acute cor pulmonale ([facility] [address]- as above 4. Gastrointestinal hemorrhage, unspecified gastrointestinal hemorrhage type- Continue current regimen of prescription and / or OTC medications , Call with recurrence 5. Severe episode of recurrent major depressive disorder, without psychotic features [facility])- See psychiatrist as directed

## 2019-04-16 ENCOUNTER — ANTI-COAG VISIT (OUTPATIENT)
Dept: INTERNAL MEDICINE CLINIC | Age: 49
End: 2019-04-16

## 2019-04-16 DIAGNOSIS — I26.99 ACUTE PULMONARY EMBOLISM WITHOUT ACUTE COR PULMONALE, UNSPECIFIED PULMONARY EMBOLISM TYPE (HCC): Primary | ICD-10-CM

## 2019-04-16 LAB
INR BLD: 1.4
PT POC: 17.1 SECONDS
VALID INTERNAL CONTROL?: YES

## 2019-04-16 RX ORDER — WARFARIN SODIUM 5 MG/1
10 TABLET ORAL DAILY
COMMUNITY
End: 2019-05-14 | Stop reason: ALTCHOICE

## 2019-04-16 NOTE — PATIENT INSTRUCTIONS
Call office Wednesday morning to report how many Lovenox syringes you have left [phone]  Resume Lovenox injections every 12 hours TODAY.     Start NEW Coumadin dose TODAY- see calendar  NEW dose: Coumadin 10 mg daily except 5 mg on Mon & Fri  (Old dose: Coumadin 5 mg daily except 10 mg on Thu/Sat/Sun)  Recheck Monday in Coumadin clinic at 8:15

## 2019-04-16 NOTE — PROGRESS NOTES
INR 1.4 today. Down from 2.7 last week, denies missing doses or diet changes. Patient instructions Damon Acuña:     Resume Lovenox injections every 12 hours today. Start new Coumadin dose today. Patient states they have Lovenox syringes on hand at home he will count them and call the office with the number Wed morning. New dose: Coumadin 10 mg daily except 5 mg on Mon & Fri  Old dose: Coumadin 5 mg daily except 10 mg on Thu/Sat/Sun  Recheck Monday in Coumadin clinic    A full discussion of the nature of anticoagulants has been carried out. A benefit risk analysis has been presented to the patient, so that they understand the justification for choosing anticoagulation at this time. The need for frequent and regular monitoring, precise dosage adjustment and compliance is stressed. Side effects of potential bleeding are discussed. The patient should avoid any OTC items containing aspirin or ibuprofen, and should avoid great swings in general diet. Avoid alcohol consumption. Call if any signs of abnormal bleeding. Patient verbalized understanding.

## 2019-04-17 ENCOUNTER — TELEPHONE (OUTPATIENT)
Dept: INTERNAL MEDICINE CLINIC | Age: 49
End: 2019-04-17

## 2019-04-17 ENCOUNTER — DOCUMENTATION ONLY (OUTPATIENT)
Dept: INTERNAL MEDICINE CLINIC | Age: 49
End: 2019-04-17

## 2019-04-17 RX ORDER — ENOXAPARIN SODIUM 100 MG/ML
60 INJECTION SUBCUTANEOUS EVERY 12 HOURS
Qty: 14 SYRINGE | Refills: 0 | Status: SHIPPED | OUTPATIENT
Start: 2019-04-17 | End: 2019-04-22 | Stop reason: ALTCHOICE

## 2019-04-17 NOTE — TELEPHONE ENCOUNTER
Tania December (Self) [phone] (H)     Pt;s wife ([phone]) calling to let Jacinto Fraga know that her  has 2 shots of his coumadin left and three refills.

## 2019-04-17 NOTE — TELEPHONE ENCOUNTER
Detailed message left for wife that rx sent for additional syringes to pharmacy and for her to call us back, pt needs to be on Lovenox every 12 hours until I see him next week in coumadin clinic.

## 2019-04-18 NOTE — TELEPHONE ENCOUNTER
Verified patient identity with two identifiers. Spoke with patient's wife she confirmed they do have Lovenox injections he is taking them every 12 hours along with the Coumadin and will be at appt next week for INR check.

## 2019-04-19 NOTE — PROGRESS NOTES
INR 2.4 today. Patient instructions Milagro Cantu:  
Discontinue Lovenox injections Continue Coumadin 10 mg daily except 5 mg on Mon & Fri 
Recheck Thursday A full discussion of the nature of anticoagulants has been carried out. A benefit risk analysis has been presented to the patient, so that they understand the justification for choosing anticoagulation at this time. The need for frequent and regular monitoring, precise dosage adjustment and compliance is stressed. Side effects of potential bleeding are discussed. The patient should avoid any OTC items containing aspirin or ibuprofen, and should avoid great swings in general diet. Avoid alcohol consumption. Call if any signs of abnormal bleeding. Patient verbalized understanding.

## 2019-04-22 ENCOUNTER — ANTI-COAG VISIT (OUTPATIENT)
Dept: INTERNAL MEDICINE CLINIC | Age: 49
End: 2019-04-22

## 2019-04-22 DIAGNOSIS — I26.99 ACUTE PULMONARY EMBOLISM WITHOUT ACUTE COR PULMONALE, UNSPECIFIED PULMONARY EMBOLISM TYPE (HCC): Primary | ICD-10-CM

## 2019-04-22 LAB
INR BLD: 2.4
PT POC: 28.3 SECONDS
VALID INTERNAL CONTROL?: YES

## 2019-04-22 NOTE — PATIENT INSTRUCTIONS
Discontinue Lovenox injections Continue Coumadin 10 mg daily except 5 mg on Mon & Fri 
Recheck Thursday at 8:15

## 2019-04-25 ENCOUNTER — OFFICE VISIT (OUTPATIENT)
Dept: INTERNAL MEDICINE CLINIC | Age: 49
End: 2019-04-25

## 2019-04-25 DIAGNOSIS — I26.99 OTHER ACUTE PULMONARY EMBOLISM WITHOUT ACUTE COR PULMONALE (HCC): Primary | ICD-10-CM

## 2019-04-25 LAB
INR BLD: 1.6
PT POC: 19.2 SECONDS
VALID INTERNAL CONTROL?: YES

## 2019-04-25 NOTE — PATIENT INSTRUCTIONS
Resume Lovenox injections every 12 hours New dose Coumadin 10 mg daily except 5 mg on Monday Recheck Tuesday at 12:30

## 2019-04-25 NOTE — PROGRESS NOTES
INR 1.6 today, down from 2.4 on Monday, denies missing doses, states his diet is consistent. Patient instructions Tay Vazquez:  
Resume Lovenox injections every 12 hours, states he has injections at home, instructed to make sure he has a enough to get through the weekend so call before end of day Friday if needs more. Start NEW dose Coumadin 10 mg daily except 5 mg on Mon Recheck Tuesday at 12:30 A full discussion of the nature of anticoagulants has been carried out. A benefit risk analysis has been presented to the patient, so that they understand the justification for choosing anticoagulation at this time. The need for frequent and regular monitoring, precise dosage adjustment and compliance is stressed. Side effects of potential bleeding are discussed. The patient should avoid any OTC items containing aspirin or ibuprofen, and should avoid great swings in general diet. Avoid alcohol consumption. Call if any signs of abnormal bleeding. Patient verbalized understanding.

## 2019-04-30 ENCOUNTER — ANTI-COAG VISIT (OUTPATIENT)
Dept: INTERNAL MEDICINE CLINIC | Age: 49
End: 2019-04-30

## 2019-04-30 DIAGNOSIS — I26.99 ACUTE PULMONARY EMBOLISM WITHOUT ACUTE COR PULMONALE, UNSPECIFIED PULMONARY EMBOLISM TYPE (HCC): Primary | ICD-10-CM

## 2019-04-30 LAB
INR BLD: 1.8
PT POC: 21.5 SECONDS
VALID INTERNAL CONTROL?: YES

## 2019-04-30 RX ORDER — LANOLIN ALCOHOL/MO/W.PET/CERES
325 CREAM (GRAM) TOPICAL DAILY
Qty: 30 TAB | Refills: 1 | Status: SHIPPED | OUTPATIENT
Start: 2019-04-30 | End: 2020-05-05 | Stop reason: SDUPTHER

## 2019-04-30 RX ORDER — ENOXAPARIN SODIUM 100 MG/ML
60 INJECTION SUBCUTANEOUS EVERY 12 HOURS
Qty: 20 SYRINGE | Refills: 0 | Status: SHIPPED | OUTPATIENT
Start: 2019-04-30 | End: 2019-05-08 | Stop reason: ALTCHOICE

## 2019-04-30 NOTE — PROGRESS NOTES
INR 1.8 today. Patient instructions Andrea Gongora Ace:  
Continue Lovenox injections every 12 hours New dose Coumadin 10 mg daily Recheck INR Wednesday 5/8/19 at 12:30 Decrease iron tab to once daily A full discussion of the nature of anticoagulants has been carried out. A benefit risk analysis has been presented to the patient, so that they understand the justification for choosing anticoagulation at this time. The need for frequent and regular monitoring, precise dosage adjustment and compliance is stressed. Side effects of potential bleeding are discussed. The patient should avoid any OTC items containing aspirin or ibuprofen, and should avoid great swings in general diet. Avoid alcohol consumption. Call if any signs of abnormal bleeding. Patient verbalized understanding.

## 2019-04-30 NOTE — PATIENT INSTRUCTIONS
Continue Lovenox injections every 12 hours New dose Coumadin 10 mg daily Recheck Wednesday 5/8/19 at 12:30

## 2019-05-08 ENCOUNTER — CLINICAL SUPPORT (OUTPATIENT)
Dept: INTERNAL MEDICINE CLINIC | Age: 49
End: 2019-05-08

## 2019-05-08 DIAGNOSIS — Z86.711 HISTORY OF PULMONARY EMBOLUS (PE): Primary | ICD-10-CM

## 2019-05-08 LAB
INR BLD: 1.9
PT POC: 22.8 SECONDS
VALID INTERNAL CONTROL?: YES

## 2019-05-08 NOTE — PROGRESS NOTES
INR 1.9 today. Patient instructions Shania Ellsworth: STOP Lovenox injections Start NEW dose Coumadin 10 mg daily, except 15mg on Wednesdays Recheck Tuesday May 14th at 1:40 at appt with Dr. Zayda Ellsworth A full discussion of the nature of anticoagulants has been carried out. A benefit risk analysis has been presented to the patient, so that they understand the justification for choosing anticoagulation at this time. The need for frequent and regular monitoring, precise dosage adjustment and compliance is stressed. Side effects of potential bleeding are discussed. The patient should avoid any OTC items containing aspirin or ibuprofen, and should avoid great swings in general diet. Avoid alcohol consumption. Call if any signs of abnormal bleeding. Patient verbalized understanding.

## 2019-05-08 NOTE — PATIENT INSTRUCTIONS
STOP Lovenox injections Start NEW dose Coumadin 10 mg daily, except 15mg on Wednesdays Recheck Tuesday May 14th at 1:40 at appt with Dr. Jeana Jacinto

## 2019-05-09 ENCOUNTER — OFFICE VISIT (OUTPATIENT)
Dept: BEHAVIORAL/MENTAL HEALTH CLINIC | Age: 49
End: 2019-05-09

## 2019-05-09 DIAGNOSIS — F19.20 SUBSTANCE DEPENDENCE (HCC): ICD-10-CM

## 2019-05-09 DIAGNOSIS — F32.2 SEVERE MAJOR DEPRESSION (HCC): Primary | ICD-10-CM

## 2019-05-11 NOTE — PROGRESS NOTES
INR 1.4 today. Patient instructions Andrea Yin Friend on call for Dr. Gongora Ace: NEW dose: Coumadin 5mg daily except 10mg on M/W/F Old dose: Coumadin 5mg daily except 10mg on Thu/Fri Continue Lovenox 60mg SQ every 12 hours Recheck INR this Thursday 4/4/19 at 9:15 with Dr. Annamaria Lowery A full discussion of the nature of anticoagulants has been carried out. A benefit risk analysis has been presented to the patient, so that they understand the justification for choosing anticoagulation at this time. The need for frequent and regular monitoring, precise dosage adjustment and compliance is stressed. Side effects of potential bleeding are discussed. The patient should avoid any OTC items containing aspirin or ibuprofen, and should avoid great swings in general diet. Avoid alcohol consumption. Call if any signs of abnormal bleeding. Patient verbalized understanding. General

## 2019-05-13 NOTE — PROGRESS NOTES
Outpatient Initial Assessment and Psychotherapy Note           Chief Complaint:     Patient presents with depression, concern about health problems and relationship difficulties    History of Present Illness: Duane Meraz is a 51 y.o. male who presents with symptoms of depression. He was referred for psychotherapy by is PCP after an overdose/suicide attempt March 6, 2019 when he was hospitalized at [facility] for 4 days and then transferred to the hospital for a week. The attempt was triggered by a disagreement with his current wife over his desire to finance a ramp for his son with CP who lives with his mother, Keesha Pedraza, with whom Annita Carson was in a relationship from 2006-17 and has 4 children from that union. Since this overdose, he has had two other hospitalizations for blood clots, which he never experienced prior. 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 He drinks beer daily and has used marijuana nightly since age 14. His son's mother took out a restraining order after a conflict in [id number] and they . There were court appearances and lack of contact with some children, such as his son with CP, whom he has not been allowed to see since that time. His recent suicide attempt brought two daughters closer with one another. He is overwhelmed with the medical problems (clots) that are new to him as prior to the suicide attempt, he had no medical problems. Past Psychiatric History:  None until suicide attempt    Previous Suicide Attempts:  Suicide attempt bu overdose Mar. 6, 2019. Hospitalization at [facility].     Trauma History:  TDB    Social Support:  family    Substance Abuse History:   Beer and marijuana dependence       Current  Medication List:   Current Outpatient Medications   Medication Sig Dispense Refill    ferrous sulfate 325 mg (65 mg iron) tablet Take 1 Tab by mouth daily. 30 Tab 1    warfarin (COUMADIN) 5 mg tablet Take 10 mg by mouth daily. Except 15mg Wednesdays      mirtazapine (REMERON) 15 mg tablet TK 1 T PO QHS FOR DEPRESSION 10 Tab 0    pantoprazole (PROTONIX) 40 mg tablet TAKE ONE TABLET BY MOUTH TWICE A DAY BEFORE BREAKFAST AND DINNER  1    acetaminophen (TYLENOL EXTRA STRENGTH) 500 mg tablet Take  by mouth.  multivitamin (ONE A DAY) tablet Take 1 Tab by mouth daily. Family Psychiatric History:   Family History   Problem Relation Age of Onset    Diabetes Mother     Cancer Mother 58        breast    Hypertension Father     Cancer Father         prostate, colon          Family medical problems:  Family History   Problem Relation Age of Onset    Diabetes Mother     Cancer Mother 58        breast    Hypertension Father     Cancer Father         prostate, colon       Social History:      Social History     Socioeconomic History    Marital status:      Spouse name: Not on file    Number of children: Not on file    Years of education: Not on file    Highest education level: Not on file   Occupational History    Not on file   Social Needs    Financial resource strain: Not on file    Food insecurity:     Worry: Not on file     Inability: Not on file    Transportation needs:     Medical: Not on file     Non-medical: Not on file   Tobacco Use    Smoking status: Never Smoker    Smokeless tobacco: Never Used   Substance and Sexual Activity    Alcohol use:  Yes     Alcohol/week: 0.6 oz     Types: 1 Standard drinks or equivalent per week     Comment: 64oz of beer weekly    Drug use: Yes     Types: Marijuana    Sexual activity: Not on file   Lifestyle    Physical activity:     Days per week: Not on file     Minutes per session: Not on file    Stress: Not on file   Relationships    Social connections:     Talks on phone: Not on file     Gets together: Not on file     Attends Christian service: Not on file     Active member of club or organization: Not on file     Attends meetings of clubs or organizations: Not on file     Relationship status: Not on file    Intimate partner violence:     Fear of current or ex partner: Not on file     Emotionally abused: Not on file     Physically abused: Not on file     Forced sexual activity: Not on file   Other Topics Concern    Not on file   Social History Narrative    Tena Kumar, 50 yo -American male is in his second marriage, 2018, to [place] ([facility] 46, who works at [employer]. He has been in three relationships and one other marriage. He has seven children. He raised two of them, Fan Armendariz, 29, and Rosemary, 24. Lane, 28, Hua Gonzales, who has , 25, All Cannon, 22 and [place], 16. He is connected with them all and supportive. 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 Allergies:   No Known Allergies       Psychiatric/Mental Status Examination:     MENTAL STATUS EXAM:  Sensorium  Alert and Oriented x 2   Relations cooperative   Eye Contact appropriate   Appearance:  within normal Limits   Motor Behavior:  within normal limits   Speech:  normal pitch and normal volume   Thought Process: logical   Thought Content free of delusions and free of hallucinations   Suicidal ideations no intention and contracts for safety   Homicidal ideations none   Mood:  anxious and depressed   Affect:  mood-congruent   Memory recent  adequate   Memory remote:  adequate   Concentration:  adequate   Abstraction:  moderate   Insight:  Fair/poor   Reliability good   Judgment:  fair         Diagnoses:       ICD-10-CM ICD-9-CM    1. Severe major depression ([facility]) F32.2 296.23    2. Substance dependence ([address] F19.20 304.90        Assessment:  Tena Kumar is unhappy and puzzled with how he has gotten to this time in his life with his estrangement from some of his children, a contentious marriage and now, health issues. Little capacity for insight.       Treatment Plan:  Weekly, individual pschotherapy      The nature and course of the patient's psychiatric diagnosis were discussed with the patient. Office and confidentiality policies and procedures were discussed with patient. The patient has my contact information for routine or urgent concerns.       Bar Woods LCSW  5/13/2019

## 2019-05-14 ENCOUNTER — OFFICE VISIT (OUTPATIENT)
Dept: INTERNAL MEDICINE CLINIC | Age: 49
End: 2019-05-14

## 2019-05-14 VITALS
RESPIRATION RATE: 18 BRPM | SYSTOLIC BLOOD PRESSURE: 128 MMHG | TEMPERATURE: 99.1 F | WEIGHT: 159 LBS | OXYGEN SATURATION: 99 % | HEIGHT: 67 IN | DIASTOLIC BLOOD PRESSURE: 88 MMHG | BODY MASS INDEX: 24.96 KG/M2 | HEART RATE: 78 BPM

## 2019-05-14 DIAGNOSIS — K92.2 GASTROINTESTINAL HEMORRHAGE, UNSPECIFIED GASTROINTESTINAL HEMORRHAGE TYPE: ICD-10-CM

## 2019-05-14 DIAGNOSIS — Z86.711 HISTORY OF PULMONARY EMBOLUS (PE): Primary | ICD-10-CM

## 2019-05-14 DIAGNOSIS — F33.2 SEVERE EPISODE OF RECURRENT MAJOR DEPRESSIVE DISORDER, WITHOUT PSYCHOTIC FEATURES (HCC): ICD-10-CM

## 2019-05-14 DIAGNOSIS — R79.89 ABNORMAL LFTS: ICD-10-CM

## 2019-05-14 LAB
INR BLD: 1.5
PT POC: 18.3 SECONDS
VALID INTERNAL CONTROL?: YES

## 2019-05-14 RX ORDER — FLUOXETINE 10 MG/1
10 CAPSULE ORAL
COMMUNITY
Start: 2019-05-08 | End: 2021-03-01

## 2019-05-14 RX ORDER — AMITRIPTYLINE HYDROCHLORIDE 25 MG/1
25 TABLET, FILM COATED ORAL
Refills: 1 | COMMUNITY
Start: 2019-04-28 | End: 2021-03-01 | Stop reason: ALTCHOICE

## 2019-05-14 NOTE — PROGRESS NOTES
INR 1.5 today. Patient instructions: A full discussion of the nature of anticoagulants has been carried out. A benefit risk analysis has been presented to the patient, so that they understand the justification for choosing anticoagulation at this time. The need for frequent and regular monitoring, precise dosage adjustment and compliance is stressed. Side effects of potential bleeding are discussed. The patient should avoid any OTC items containing aspirin or ibuprofen, and should avoid great swings in general diet. Avoid alcohol consumption. Call if any signs of abnormal bleeding. Patient verbalized understanding.

## 2019-05-14 NOTE — PROGRESS NOTES
HISTORY OF PRESENT ILLNESS  Karoline  is a 51 y.o. male. HPI Subjective: Anupama Holman is seen today for follow up of PE and other medical problems. 1. PE. His INR is low once again. We have had a terrific problem managing his Warfarin and he continues to go on and off Lovenox due to subtherapeutic INR readings. I think at this point we could try a NOAC once again. This was stopped in the setting of GI bleeding by the physicians at [facility]. There was a clear cut reason for his bleeding, that being inappropriate NSAID use while on the Eliquis. I think the once daily medication, Xarelto, will be more appropriate for him. We called his wife to review this and she agrees. We also made strict directions that no dosages of the medications can be missed, and if they are running low on the medication or having difficulty getting it covered at the pharmacy, they will contact us immediately. 2. Depression, much improved. His spirits seem much better. He is following up with psychiatry and counseling. 3. GI bleed with anemia, abnormal LFTs. Due for routine labs. Review of Systems:  Notable for feeling much better overall. Current Outpatient Medications   Medication Sig    FLUoxetine (PROZAC) 10 mg capsule Take 10 mg by mouth nightly.  amitriptyline (ELAVIL) 25 mg tablet Take 25 mg by mouth nightly.  rivaroxaban (XARELTO) 20 mg tab tablet Take 1 Tab by mouth daily (with dinner).  ferrous sulfate 325 mg (65 mg iron) tablet Take 1 Tab by mouth daily.  mirtazapine (REMERON) 15 mg tablet TK 1 T PO QHS FOR DEPRESSION    pantoprazole (PROTONIX) 40 mg tablet TAKE ONE TABLET BY MOUTH TWICE A DAY BEFORE BREAKFAST AND DINNER    multivitamin (ONE A DAY) tablet TAKE 1 TABLET BY MOUTH EVERY DAY    acetaminophen (TYLENOL EXTRA STRENGTH) 500 mg tablet Take  by mouth. No current facility-administered medications for this visit.           Review of Systems   Endo/Heme/Allergies: Does not bruise/bleed easily. Psychiatric/Behavioral: Negative for depression. Physical Exam   Constitutional: No distress. Cardiovascular: Normal rate and regular rhythm. Exam reveals no gallop and no friction rub. No murmur heard. Pulmonary/Chest: Effort normal and breath sounds normal.   Neurological: He is alert. Skin: Skin is warm and dry. Psychiatric: He has a normal mood and affect. His behavior is normal.   Nursing note and vitals reviewed. ASSESSMENT and PLAN  Diagnoses and all orders for this visit:    1. History of pulmonary embolus (PE)- Proceed with plan as discussed   -     AMB POC PT/INR  -     CBC WITH AUTOMATED DIFF    2. Gastrointestinal hemorrhage, unspecified gastrointestinal hemorrhage type  -     CBC WITH AUTOMATED DIFF    3. Severe episode of recurrent major depressive disorder, without psychotic features [facility])- See specialists  as directed. 4. Abnormal LFTs  -     HEPATIC FUNCTION PANEL    Other orders  -     rivaroxaban (XARELTO) 20 mg tab tablet; Take 1 Tab by mouth daily (with dinner).     Plan was reviewed with patient and family, understanding expressed

## 2019-05-14 NOTE — PROGRESS NOTES
Met with patient to review his current medications and potential supplement use. Patient newly started on warfarin with fluctuating INRs. Patient denies any changes in his vitamin K intake, he doesn't take any OTC/herbal supplements. The only new medication that was started was fluoxetine. Patient does smoke varying amounts of marijuana on the weekend which has been reported to increase INR based on inhibitory effects on CYP 2C9. Discussed with PCP and Cyndee De Anda, will change patient to Xarelto. Based on formulary Xarelto would be the preferred agent. Patient given 7 day sample along with 30 day voucher. Discussed with patient trying to get prescription filled today/tomorrow so if there is any problem we can work on it. Reviewed with patient stopping warfarin tonight and starting Xarelto today since INR: 1.5. Patient verbalized understanding.  
 
Blanca Yañez, PharmD, Akiko Zepeda

## 2019-05-14 NOTE — PATIENT INSTRUCTIONS
STOP Coumadin/Warfarin today START taking Xarelto/Rivaroxaban 20mg daily with dinner today. Always take this medication with food. You have been given a weeks supply of samples of Xarelto, a card for one months supply free and a card for monthly co-pay savings. Rivaroxaban (By mouth) Rivaroxaban ([id number]-ban) Treats and prevents blood clots, which lowers the risk of stroke, deep vein thrombosis (DVT), pulmonary embolism (PE), and similar conditions. This medicine is a blood thinner. Brand Name(s): Xarelto There may be other brand names for this medicine. When This Medicine Should Not Be Used: This medicine is not right for everyone. Do not use it if you had an allergic reaction to rivaroxaban, or you have severe bleeding. How to Use This Medicine:  
Tablet · Take this medicine as directed, and take it at the same time each day. · 15-mg or 20-mg tablet: Take with food. · If you cannot swallow the tablets, you may crush the tablet and mix it with applesauce. Eat some food after you swallow the mixture. · This medicine should come with a Medication Guide. Ask your pharmacist for a copy if you do not have one. · Missed dose: ¨ Ask your doctor or pharmacist if you are not sure what to do if you miss a dose. ¨ Once-daily dose: If you miss a dose or forget to use your medicine, use it as soon as you can on the same day. Do not use extra medicine to make up for a missed dose. Store the medicine in a closed container at room temperature, away from heat, moisture, and direct light. Drugs and Foods to Avoid: Ask your doctor or pharmacist before using any other medicine, including over-the-counter medicines, vitamins, and herbal products. · Some foods and medicines can affect how rivaroxaban works. Tell your doctor if you are using any of the following: ¨ NSAID medicine (including aspirin, celecoxib, diclofenac, ibuprofen, naproxen) ¨ Ketoconazole, itraconazole, lopinavir, ritonavir, indinavir, conivaptan, carbamazepine, phenytoin, rifampin, Ledy's wort ¨ Another blood thinner (including clopidogrel, enoxaparin, heparin, warfarin) Warnings While Using This Medicine: · Tell your doctor if you are pregnant or breastfeeding, or if you have kidney disease, liver disease, bleeding problems, or an artificial heart valve. · This medicine may increase your risk of bleeding. Be careful to avoid injuries that could cause bleeding. Stay away from rough sports or other situations where you could be bruised, cut, or hurt. Brush and floss your teeth gently. Be careful when using sharp objects, including razors and fingernail clippers. Avoid picking your nose. If you need to blow your nose, blow it gently. · This medicine may cause nerve damage if you have a medical procedure done to your back, including anesthesia or a spinal puncture. This is more likely to happen if you have a history of back injury, back surgery, problems with your spine, or procedures or punctures to your back. Tell your doctor if you are also taking another blood thinner, because this also increases the risk. · Do not stop using this medicine suddenly without asking your doctor. You might have a higher risk of stroke for a short time after you stop using this medicine. · Tell any doctor or dentist who treats you that you are using this medicine. · Your doctor will do lab tests at regular visits to check on the effects of this medicine. Keep all appointments. · Keep all medicine out of the reach of children. Never share your medicine with anyone. Possible Side Effects While Using This Medicine:  
Call your doctor right away if you notice any of these side effects: · Allergic reaction: Itching or hives, swelling in your face or hands, swelling or tingling in your mouth or throat, chest tightness, trouble breathing · Blistering, peeling, or red skin rash · Decrease in how much or how often you urinate · Heavy menstrual bleeding, or vaginal bleeding · Red or brown urine, bloody or black, tarry stools · Unusual bleeding or bruising, including frequent nosebleeds · Vomiting blood or material that looks like coffee grounds If you notice other side effects that you think are caused by this medicine, tell your doctor. Call your doctor for medical advice about side effects. You may report side effects to FDA at 6-805-AUS-8170 © 2017 [facility] Information is for End User's use only and may not be sold, redistributed or otherwise used for commercial purposes. The above information is an  only. It is not intended as medical advice for individual conditions or treatments. Talk to your doctor, nurse or pharmacist before following any medical regimen to see if it is safe and effective for you.

## 2019-05-15 ENCOUNTER — TELEPHONE (OUTPATIENT)
Dept: INTERNAL MEDICINE CLINIC | Age: 49
End: 2019-05-15

## 2019-05-15 LAB
ALBUMIN SERPL-MCNC: 4.7 G/DL (ref 3.5–5.5)
ALP SERPL-CCNC: 59 IU/L (ref 39–117)
ALT SERPL-CCNC: 23 IU/L (ref 0–44)
AST SERPL-CCNC: 22 IU/L (ref 0–40)
BASOPHILS # BLD AUTO: 0 X10E3/UL (ref 0–0.2)
BASOPHILS NFR BLD AUTO: 0 %
BILIRUB DIRECT SERPL-MCNC: 0.07 MG/DL (ref 0–0.4)
BILIRUB SERPL-MCNC: <0.2 MG/DL (ref 0–1.2)
EOSINOPHIL # BLD AUTO: 0 X10E3/UL (ref 0–0.4)
EOSINOPHIL NFR BLD AUTO: 1 %
ERYTHROCYTE [DISTWIDTH] IN BLOOD BY AUTOMATED COUNT: 15.1 % (ref 12.3–15.4)
HCT VFR BLD AUTO: 42.5 % (ref 37.5–51)
HGB BLD-MCNC: 13.7 G/DL (ref 13–17.7)
IMM GRANULOCYTES # BLD AUTO: 0 X10E3/UL (ref 0–0.1)
IMM GRANULOCYTES NFR BLD AUTO: 0 %
LYMPHOCYTES # BLD AUTO: 1.6 X10E3/UL (ref 0.7–3.1)
LYMPHOCYTES NFR BLD AUTO: 43 %
MCH RBC QN AUTO: 27.5 PG (ref 26.6–33)
MCHC RBC AUTO-ENTMCNC: 32.2 G/DL (ref 31.5–35.7)
MCV RBC AUTO: 85 FL (ref 79–97)
MONOCYTES # BLD AUTO: 0.2 X10E3/UL (ref 0.1–0.9)
MONOCYTES NFR BLD AUTO: 6 %
NEUTROPHILS # BLD AUTO: 1.9 X10E3/UL (ref 1.4–7)
NEUTROPHILS NFR BLD AUTO: 50 %
PLATELET # BLD AUTO: 272 X10E3/UL (ref 150–379)
PROT SERPL-MCNC: 7.3 G/DL (ref 6–8.5)
RBC # BLD AUTO: 4.98 X10E6/UL (ref 4.14–5.8)
WBC # BLD AUTO: 3.8 X10E3/UL (ref 3.4–10.8)

## 2019-05-15 NOTE — TELEPHONE ENCOUNTER
Verified patient identity with two identifiers. Spoke with patient wife by phone. Reiterated instructions to stop Coumadin start Xarelto 20 mg daily with food, do not miss doses of this medication. Continue to monitor for unsual bruising or bleeding as this is still a blood thinner. 7 days of samples given plus co-pay card, please attempt to  med today to make sure no issues with insurance. Patient wife verbalized understanding.

## 2019-05-15 NOTE — TELEPHONE ENCOUNTER
Per Dr. Leila Qiu and ok with pt in office yesterday called pt wife to discuss medication change. Left message for patient wife to return call.

## 2019-05-16 ENCOUNTER — OFFICE VISIT (OUTPATIENT)
Dept: BEHAVIORAL/MENTAL HEALTH CLINIC | Age: 49
End: 2019-05-16

## 2019-05-16 ENCOUNTER — TELEPHONE (OUTPATIENT)
Dept: INTERNAL MEDICINE CLINIC | Age: 49
End: 2019-05-16

## 2019-05-16 DIAGNOSIS — F32.2 SEVERE MAJOR DEPRESSION (HCC): Primary | ICD-10-CM

## 2019-05-16 DIAGNOSIS — F19.20 SUBSTANCE DEPENDENCE (HCC): ICD-10-CM

## 2019-05-16 RX ORDER — BISMUTH SUBSALICYLATE 262 MG
TABLET,CHEWABLE ORAL
Qty: 30 TAB | Refills: 0 | Status: SHIPPED | OUTPATIENT
Start: 2019-05-16 | End: 2020-05-05 | Stop reason: SDUPTHER

## 2019-05-16 NOTE — PROGRESS NOTES
PSYCHOTHERAPY NOTE      Amy Chaudhary is a 51 y.o. male who presents with anxiety and depression. Client was seen for an Individual Therapy session that lasted for 50 minutes. Focus of session/Issues addressed:  Tena Kumar presents depressed and distraught about work issues and, mainly, grieving the loss of seeing two of his sons. 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 He feels he is having memory and distraction issues. Appears these are a byproduct of his depression. States he must be constantly working to keep from racing thoughts, especially with regard to not seeing his son with CP and younger son. Their mother is keeping this from happening with the older son and the younger one Is court ordered. He continues to use beer and marijuana to escape and has little insight into this and/or resists the 's suggestion that he could appeal the decision if he can show he is clean. Feels his life has \"blown apart like a puzzle\" and the pieces he worked hard to connect are all scattered. Wrote letter to son's mother. Social History     Social History Narrative    Tena Kumar, 52 yo -American male is in his second marriage, 2018, to [place] ([facility]), 46, who works at [employer]. He has been in three relationships and one other marriage. He has seven children. He raised two of them, Fan Armendariz, 29, and Rosemary, 24. [address], 28, Hua Gonzales, who has CP, 25, [place], 22 and [place], 16. He is connected with them all and supportive. 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           Mental Status exam:         Sensorium  Alert and Oriented x 2   Relations cooperative   Appearance:  within normal Limits   Motor Behavior:  within normal limits   Speech:  normal pitch and normal volume   Thought Process: within normal limits   Thought Content free of delusions and free of hallucinations Suicidal ideations none   Homicidal ideations none   Mood:  anxious and depressed   Affect:  mood-congruent and sad   Memory recent  adequate   Memory remote:  adequate   Concentration:  adequate   Abstraction:  concrete   Insight:  poor   Reliability fair   Judgment:  fair         DIAGNOSIS AND IMPRESSION:    Current Diagnosis:       ICD-10-CM ICD-9-CM    1. Severe major depression (HCC) F32.2 296.23    2. Substance dependence ([facility] F19.20 304.90      Strengths:  Commitment to his parenting role. Identified goals for therapy are  Decrease depression  Decrease anxiety  Increase awareness of his addiction    Clinical assignments:   Send letter to children's mother. Interventions/plans:    Supportive/Cognitive/Reality-Oriented psychotherapy provided in regards to psychosocial stressors and current problems. anxiety, depression, employment issues, anger management, concern about health problems, difficulty sleeping, relationship difficulties and substance abuse  Psychoeducation provided.   Treatment plan reviewed with patient-including diagnosis and medications      Oliva Miner LCSW

## 2019-05-23 ENCOUNTER — OFFICE VISIT (OUTPATIENT)
Dept: BEHAVIORAL/MENTAL HEALTH CLINIC | Age: 49
End: 2019-05-23

## 2019-05-23 DIAGNOSIS — F19.20 SUBSTANCE DEPENDENCE (HCC): ICD-10-CM

## 2019-05-23 DIAGNOSIS — F32.2 SEVERE MAJOR DEPRESSION (HCC): Primary | ICD-10-CM

## 2019-05-23 NOTE — PROGRESS NOTES
PSYCHOTHERAPY NOTE      Pardeep Marie is a 51 y.o. male who presents with anxiety and depression. Client was seen for an Individual Therapy session that lasted for 50 minutes. Focus of session/Issues addressed:  Ольга New is working hard, as there have been short staff days at work. He is going to visit his father this weekend [place]. Session focused on exploring his nuclear family and the influence this has had on his parenting and decisions. He is working on becoming clean and has ceased smoking weed. Challenge continues in his being able to relax, always needing to be busy and occupied (like his father). He is longing to reconnect with his sons and is working towards this. Sent letter to [facility] and anxiously awaiting reply. Embracing life. Social History     Social History Narrative    Ольга New, 52 yo -American male is in his second marriage, 2018, to [place] ([facility]), 46, who works at [employer]. He has been in three relationships and one other marriage. He has seven children. He raised two of them, Margarita Menainz, 29, and Rosemary, 24. [address], 28, Rochelle Marie, who has CP, 25, [place], 22 and [place], 16. He is connected with them all and supportive. 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           Mental Status exam:         Sensorium  Alert and Oriented x 2   Relations cooperative   Appearance:  within normal Limits   Motor Behavior:  within normal limits   Speech:  normal pitch and normal volume   Thought Process: within normal limits   Thought Content free of delusions and free of hallucinations   Suicidal ideations none   Homicidal ideations none   Mood:  anxious and depressed   Affect:  mood-congruent and sad   Memory recent  adequate   Memory remote:  adequate   Concentration:  adequate   Abstraction:  concrete   Insight:  poor   Reliability fair   Judgment:  fair         DIAGNOSIS AND IMPRESSION:    Current Diagnosis:       ICD-10-CM ICD-9-CM 1. Severe major depression (HCC) F32.2 296.23    2. Substance dependence ([facility] F19.20 304.90      Strengths:  Commitment to his parenting role. Identified goals for therapy are  Decrease depression  Decrease anxiety  Increase awareness of his addiction    Clinical assignments:   Talk to father about his life. Interventions/plans:    Supportive/Cognitive/Reality-Oriented psychotherapy provided in regards to psychosocial stressors and current problems. anxiety, depression, employment issues, anger management, concern about health problems, difficulty sleeping, relationship difficulties and substance abuse  Psychoeducation provided.   Treatment plan reviewed with patient-including diagnosis and medications      Merlinda Keel, LCSW

## 2019-06-10 ENCOUNTER — OFFICE VISIT (OUTPATIENT)
Dept: BEHAVIORAL/MENTAL HEALTH CLINIC | Age: 49
End: 2019-06-10

## 2019-06-10 DIAGNOSIS — F19.20 SUBSTANCE DEPENDENCE (HCC): ICD-10-CM

## 2019-06-10 DIAGNOSIS — F32.2 SEVERE MAJOR DEPRESSION (HCC): Primary | ICD-10-CM

## 2019-06-10 NOTE — PROGRESS NOTES
PSYCHOTHERAPY NOTE      Duane Meraz is a 47 y.o. male who presents with anxiety and depression. Client was seen for an Individual Therapy session that lasted for 50 minutes. Focus of session/Issues addressed:  [facility] is working hard, with much overtime, as there have been short staff days at work. He is pleased that he has been learning to relax and enjoy some time with family and his wife. They made a trip to see his father and step-mother in [place]; he had time to obrien with his father and enjoyed the tourist sites and a leisurely trip home. Then, he went to [place] in a visit with his mother. They are planning a family reunion in [facility] our the fourth. He realizes this was a part of his life that was missing and is more aware of the pressures of being constantly busy. Smiling and embracing life. Continues to be marijuana free. Handled pressure for money from oldest daughter well, not allowing it to stress him. Gave support. Social History     Social History Narrative    [facility], 52 yo -American male is in his second marriage, 2018, to [place] ([facility]), 46, who works at [employer]. He has been in three relationships and one other marriage. He has seven children. He raised two of them, Kaley Rodriguez, 29, and Rosemary, 24[address], 28, Sherwin Collet, who has CP, 25, [facility]", 22 and [place], 16. He is connected with them all and supportive. 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           Mental Status exam:         Sensorium  Alert and Oriented x 2   Relations cooperative   Appearance:  within normal Limits   Motor Behavior:  within normal limits   Speech:  normal pitch and normal volume   Thought Process: within normal limits   Thought Content free of delusions and free of hallucinations   Suicidal ideations none   Homicidal ideations none   Mood:  anxious and depressed   Affect:  mood-congruent, pleasant   Memory recent  adequate   Memory remote: adequate   Concentration:  adequate   Abstraction:  concrete   Insight:  fair   Reliability fair   Judgment:  fair         DIAGNOSIS AND IMPRESSION:    Current Diagnosis:       ICD-10-CM ICD-9-CM    1. Severe major depression ([facility]) F32.2 296.23    2. Substance dependence ([facility] F19.20 304.90      Strengths:  Commitment to his parenting role. Identified goals for therapy are  Decrease depression  Decrease anxiety  Increase awareness of his addiction    Clinical assignments:   Continue to make time for leisure activities    Interventions/plans:    Supportive/Cognitive/Reality-Oriented psychotherapy provided in regards to psychosocial stressors and current problems. anxiety, depression, employment issues, anger management, concern about health problems, difficulty sleeping, relationship difficulties and substance abuse  Psychoeducation provided.   Treatment plan reviewed with patient-including diagnosis and medications      Precious Heard LCSW

## 2019-06-18 ENCOUNTER — OFFICE VISIT (OUTPATIENT)
Dept: BEHAVIORAL/MENTAL HEALTH CLINIC | Age: 49
End: 2019-06-18

## 2019-06-18 DIAGNOSIS — F32.2 SEVERE MAJOR DEPRESSION (HCC): Primary | ICD-10-CM

## 2019-06-18 NOTE — PROGRESS NOTES
PSYCHOTHERAPY NOTE      Karoline  is a 47 y.o. male who presents with mile anxiety and mild depression. Client was seen for an Individual Therapy session that lasted for 50 minutes. Focus of session/Issues addressed:  Anupama Holman had a disappointment on Father's Day which he handled well. Accepting what he cannot change nor have have control over and enjoying what is possible, such as [facility] [address] [facility] and visiting a daughter there. He is working in his garage and finishing a project on an '82 car. When there were car problems on the way [place] [facility] [address] [facility], he handled it calmly and his wife commented on the change. He reports sleeping well. Social History     Social History Narrative    Anupama Holman, 50 yo -American male is in his second marriage, 2018, to [place] ([facility] 46, who works at [employer]. He has been in three relationships and one other marriage. He has seven children. He raised two of them, [employer] [facility], 29, and Rosemary, 24. Zaheerrachel, 28, [facility] Shmuel, who has , 25, [facility], 22 and [place], 16. He is connected with them all and supportive. 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 Mental Status exam:         Sensorium  Alert and Oriented x 2   Relations cooperative   Appearance:  within normal Limits   Motor Behavior:  within normal limits   Speech:  normal pitch and normal volume   Thought Process: within normal limits   Thought Content free of delusions and free of hallucinations   Suicidal ideations none   Homicidal ideations none   Mood:  Mildly anxious and mildly depressed   Affect:  mood-congruent, pleasant   Memory recent  adequate   Memory remote:  adequate   Concentration:  adequate   Abstraction:  concrete   Insight:  fair   Reliability fair   Judgment:  fair         DIAGNOSIS AND IMPRESSION:    Current Diagnosis:       ICD-10-CM ICD-9-CM    1.  Severe major depression (HCC) F32.2 296.23 Strengths:  Commitment to his parenting role. Identified goals for therapy are  Decrease depression  Decrease anxiety  Increase awareness of his addiction    Clinical assignments:   Continue to make time for leisure activities    Interventions/plans:    Supportive/Cognitive/Reality-Oriented psychotherapy provided in regards to psychosocial stressors and current problems. anxiety, depression, employment issues, anger management, concern about health problems, difficulty sleeping, relationship difficulties and substance abuse  Psychoeducation provided.   Treatment plan reviewed with patient-including diagnosis and medications      Beverly Lechuga LCSW

## 2019-06-26 DIAGNOSIS — T14.91XA SUICIDE ATTEMPT (HCC): ICD-10-CM

## 2019-06-26 RX ORDER — PANTOPRAZOLE SODIUM 40 MG/1
TABLET, DELAYED RELEASE ORAL
Qty: 60 TAB | Refills: 3 | Status: SHIPPED | OUTPATIENT
Start: 2019-06-26 | End: 2019-12-11 | Stop reason: SDUPTHER

## 2019-06-26 RX ORDER — PANTOPRAZOLE SODIUM 40 MG/1
TABLET, DELAYED RELEASE ORAL
Refills: 1 | Status: CANCELLED | OUTPATIENT
Start: 2019-06-26

## 2019-06-26 NOTE — TELEPHONE ENCOUNTER
Patient needs refill of Mirtazapine until he sees his psych doctor.   Pantoprazole was prescribed in hospital.

## 2019-06-26 NOTE — TELEPHONE ENCOUNTER
Verified patient identity with two identifiers. Spoke with patient wife by phone who has some questions regarding Xarelto, clarified ok to take Tylenol for pain but no NSAIDs (aleve, motrin etc). Reinforced to take Xarelto with food. Wife verbalized understanding. Needs refill on protonix.

## 2019-06-28 RX ORDER — MIRTAZAPINE 15 MG/1
TABLET, FILM COATED ORAL
Qty: 10 TAB | Refills: 0 | Status: SHIPPED | OUTPATIENT
Start: 2019-06-28 | End: 2021-03-01 | Stop reason: SDUPTHER

## 2019-07-26 ENCOUNTER — TELEPHONE (OUTPATIENT)
Dept: INTERNAL MEDICINE CLINIC | Age: 49
End: 2019-07-26

## 2019-07-26 ENCOUNTER — OFFICE VISIT (OUTPATIENT)
Dept: INTERNAL MEDICINE CLINIC | Age: 49
End: 2019-07-26

## 2019-07-26 VITALS
WEIGHT: 154 LBS | HEART RATE: 85 BPM | BODY MASS INDEX: 24.17 KG/M2 | HEIGHT: 67 IN | OXYGEN SATURATION: 98 % | RESPIRATION RATE: 18 BRPM | SYSTOLIC BLOOD PRESSURE: 136 MMHG | TEMPERATURE: 98.2 F | DIASTOLIC BLOOD PRESSURE: 94 MMHG

## 2019-07-26 DIAGNOSIS — Z86.711 HISTORY OF PULMONARY EMBOLUS (PE): ICD-10-CM

## 2019-07-26 DIAGNOSIS — R58 BLEEDING: ICD-10-CM

## 2019-07-26 DIAGNOSIS — R20.2 ARM PARESTHESIA, RIGHT: ICD-10-CM

## 2019-07-26 DIAGNOSIS — R04.0 EPISTAXIS: Primary | ICD-10-CM

## 2019-07-26 NOTE — TELEPHONE ENCOUNTER
Spoke with pt - states he started with nose bleeds Monday - lasting about 5 minutes. Last one this morning. He also states has been experiencing his gums bleeding after brushing his teeth x 1 month. Asked if he had told anyone? No thought it was just hard tooth brush so got softer one but still has bleeding. Advised him he needed to schedule appt for evaluation.  Scheduled today 7/26/19 at 12:40 pm. Will forward to MD.

## 2019-07-26 NOTE — LETTER
NOTIFICATION RETURN TO WORK / SCHOOL 
 
7/26/2019 1:14 PM 
 
Mr. Sharad Walsh [address] [place] [id number] To Whom It May Concern: 
 
Sharad Walsh is currently under the care of Patrick Krishnamurthy. He will return to work/school on: 7 31 19 If there are questions or concerns please have the patient contact our office. Sincerely, Magen Medina MD

## 2019-07-26 NOTE — TELEPHONE ENCOUNTER
[phone] Patina      Please call to discuss his gums bleeding when he brushes his teeth .  Also nose bleeds

## 2019-07-26 NOTE — PROGRESS NOTES
HISTORY OF PRESENT ILLNESS  Hunter Mcqueen is a 47 y.o. male. [facility] Sury Giraldo is seen today for abnormal bleeding and other problems. 1. Abnormal bleeding. He has noticed onset of gum bleeding over about the past month after he brushes his teeth, there can be some blood mixed with saliva. He tried changing his toothbrush. He has no intraoral lesions but has not been to the dentist for at least 2 years. I think this is likely the root of the bleeding in the setting of his chronic anticoagulation therapy. He has also developed nose bleeding this week. He had had 3 separate episodes. The bleeding has been stopped easily and the amount of bleeding would be classified as mild. We talked about an ENT consultation. Medications fully reviewed and he is not taking any over the counter NSAIDS or aspirin. He uses Tylenol only on a p.r.n. basis. He is taking his Xarelto as prescribed. REVIEW OF SYSTEMS:  Notable for 4/10 right arm pain. He has associated numbness and tingling. The focus of the pain and sensory change appears to be the elbow. He is very physically active at work. Symptoms have been present for about a month. Reviewed the possibility of ulnar neuropathy and advised orthopedic consultation. Current Outpatient Medications   Medication Sig    mirtazapine (REMERON) 15 mg tablet TK 1 T PO QHS FOR DEPRESSION    pantoprazole (PROTONIX) 40 mg tablet TAKE ONE TABLET BY MOUTH TWICE A DAY BEFORE BREAKFAST AND DINNER    XARELTO 20 mg tab tablet TAKE 1 TABLET BY MOUTH DAILY WITH DINNER    multivitamin (ONE A DAY) tablet TAKE 1 TABLET BY MOUTH EVERY DAY    FLUoxetine (PROZAC) 10 mg capsule Take 10 mg by mouth nightly.  amitriptyline (ELAVIL) 25 mg tablet Take 25 mg by mouth nightly.  ferrous sulfate 325 mg (65 mg iron) tablet Take 1 Tab by mouth daily.  acetaminophen (TYLENOL EXTRA STRENGTH) 500 mg tablet Take  by mouth.      No current facility-administered medications for this visit. Review of Systems   HENT: Positive for nosebleeds. Gastrointestinal: Negative for blood in stool and melena. Endo/Heme/Allergies: Bruises/bleeds easily. Physical Exam   HENT:   Nose: Mucosal edema present. No rhinorrhea or nasal septal hematoma. No epistaxis. Mouth/Throat: No oropharyngeal exudate. Eyes: Conjunctivae are normal. Right eye exhibits no discharge. Left eye exhibits no discharge. Cardiovascular:   Pulses:       Radial pulses are 2+ on the right side. Lymphadenopathy:     He has no cervical adenopathy. ASSESSMENT and PLAN  Diagnoses and all orders for this visit:    1. Epistaxis  -     REFERRAL TO ENT-OTOLARYNGOLOGY    2. History of pulmonary embolus (PE)  - Continue current regimen of prescription and / or OTC medications     3.  Arm paresthesia, right  -     REFERRAL TO ORTHOPEDIC SURGERY    4. Bleeding  -     PTT  -     PROTHROMBIN TIME + INR  -     CBC WITH AUTOMATED DIFF

## 2019-07-27 LAB
APTT PPP: 32 SEC (ref 24–33)
BASOPHILS # BLD AUTO: 0 X10E3/UL (ref 0–0.2)
BASOPHILS NFR BLD AUTO: 0 %
EOSINOPHIL # BLD AUTO: 0.1 X10E3/UL (ref 0–0.4)
EOSINOPHIL NFR BLD AUTO: 1 %
ERYTHROCYTE [DISTWIDTH] IN BLOOD BY AUTOMATED COUNT: 16.1 % (ref 12.3–15.4)
HCT VFR BLD AUTO: 42.1 % (ref 37.5–51)
HGB BLD-MCNC: 13.8 G/DL (ref 13–17.7)
IMM GRANULOCYTES # BLD AUTO: 0 X10E3/UL (ref 0–0.1)
IMM GRANULOCYTES NFR BLD AUTO: 0 %
INR PPP: 1.1 (ref 0.8–1.2)
LYMPHOCYTES # BLD AUTO: 1.5 X10E3/UL (ref 0.7–3.1)
LYMPHOCYTES NFR BLD AUTO: 41 %
MCH RBC QN AUTO: 26.5 PG (ref 26.6–33)
MCHC RBC AUTO-ENTMCNC: 32.8 G/DL (ref 31.5–35.7)
MCV RBC AUTO: 81 FL (ref 79–97)
MONOCYTES # BLD AUTO: 0.1 X10E3/UL (ref 0.1–0.9)
MONOCYTES NFR BLD AUTO: 3 %
NEUTROPHILS # BLD AUTO: 1.9 X10E3/UL (ref 1.4–7)
NEUTROPHILS NFR BLD AUTO: 55 %
PLATELET # BLD AUTO: 228 X10E3/UL (ref 150–450)
PROTHROMBIN TIME: 11.5 SEC (ref 9.1–12)
RBC # BLD AUTO: 5.2 X10E6/UL (ref 4.14–5.8)
WBC # BLD AUTO: 3.6 X10E3/UL (ref 3.4–10.8)

## 2020-04-17 DIAGNOSIS — K21.00 REFLUX ESOPHAGITIS: Primary | ICD-10-CM

## 2020-04-17 RX ORDER — PANTOPRAZOLE SODIUM 40 MG/1
TABLET, DELAYED RELEASE ORAL
Qty: 60 TAB | Refills: 0 | Status: SHIPPED | OUTPATIENT
Start: 2020-04-17 | End: 2020-05-23

## 2020-05-06 ENCOUNTER — VIRTUAL VISIT (OUTPATIENT)
Dept: INTERNAL MEDICINE CLINIC | Age: 50
End: 2020-05-06

## 2020-05-06 DIAGNOSIS — M25.50 GENERALIZED JOINT PAIN: ICD-10-CM

## 2020-05-06 DIAGNOSIS — Z12.5 SCREENING FOR PROSTATE CANCER: ICD-10-CM

## 2020-05-06 DIAGNOSIS — I26.99 OTHER ACUTE PULMONARY EMBOLISM WITHOUT ACUTE COR PULMONALE (HCC): ICD-10-CM

## 2020-05-06 DIAGNOSIS — F33.42 RECURRENT MAJOR DEPRESSIVE DISORDER, IN FULL REMISSION (HCC): ICD-10-CM

## 2020-05-06 DIAGNOSIS — K21.00 REFLUX ESOPHAGITIS: Primary | ICD-10-CM

## 2020-05-06 RX ORDER — DICLOFENAC SODIUM 10 MG/G
2 GEL TOPICAL
Qty: 100 G | Refills: 5 | Status: SHIPPED | OUTPATIENT
Start: 2020-05-06

## 2020-05-06 NOTE — PROGRESS NOTES
HISTORY OF PRESENT ILLNESS  Stephen Pozo is a 47 y.o. male. [employer] , This is a real-time audio/video visit brought to you by our sponsors, the fine folks at [facility] [facility] [employer]. JUANCARLOS Seals is seen today for follow-up of pulmonary embolism and other problems. He is actually overdue for routine follow-up. 1. Pulmonary embolism. He is on medication and states compliance. He denies any unusual bleeding. 2. Depression. He follows up with Psychiatry. He sees his practitioner about every 3 months. He feels stable from the standpoint. 3. GERD, stable on current regimen. Review of systems is notable for some aches and pains. These are newer in onset, seems to be worsening. He takes Tylenol with mild relief. Reviewed with him the contraindication of NSAIDs with his current anticoagulation measurement. He expresses understanding. I think we could try p.r.n. Voltaren Gel. He notes stable weight. Family history updated for his sister being diagnosed with breast cancer. Reviewed other family members having cancer as well. Review of Systems   Constitutional: Negative for chills and fever. Gastrointestinal: Negative for abdominal pain, blood in stool, heartburn and melena. Musculoskeletal: Positive for joint pain. Endo/Heme/Allergies: Does not bruise/bleed easily. Psychiatric/Behavioral: Negative for depression. Physical Exam  Vitals signs and nursing note reviewed. Constitutional:       Appearance: He is not ill-appearing. Skin:     General: Skin is warm and dry. Neurological:      Mental Status: He is alert. Psychiatric:         Behavior: Behavior normal.         ASSESSMENT and PLAN  Diagnoses and all orders for this visit:    1. Reflux esophagitis- Continue current regimen of prescription and / or OTC medications     2.  Other acute pulmonary embolism without acute cor pulmonale (HCC)  -     METABOLIC PANEL, COMPREHENSIVE  -     CBC WITH AUTOMATED DIFF  -     URINALYSIS W/ RFLX MICROSCOPIC    3. Recurrent major depressive disorder, in full remission [facility])- See psychiatrist as directed     4. Generalized joint pain  -     diclofenac (VOLTAREN) 1 % gel; Apply 2 g to affected area four (4) times daily as needed for Pain.     5. Screening for prostate cancer  -     PSA SCREENING (SCREENING)

## 2020-05-12 ENCOUNTER — OFFICE VISIT (OUTPATIENT)
Dept: INTERNAL MEDICINE CLINIC | Age: 50
End: 2020-05-12

## 2020-05-12 VITALS — TEMPERATURE: 98.1 F

## 2020-05-12 DIAGNOSIS — K58.9 IRRITABLE BOWEL SYNDROME, UNSPECIFIED TYPE: Primary | ICD-10-CM

## 2020-05-13 LAB
ALBUMIN SERPL-MCNC: 4.5 G/DL (ref 4–5)
ALBUMIN/GLOB SERPL: 1.7 {RATIO} (ref 1.2–2.2)
ALP SERPL-CCNC: 64 IU/L (ref 39–117)
ALT SERPL-CCNC: 23 IU/L (ref 0–44)
APPEARANCE UR: CLEAR
AST SERPL-CCNC: 26 IU/L (ref 0–40)
BASOPHILS # BLD AUTO: 0 X10E3/UL (ref 0–0.2)
BASOPHILS NFR BLD AUTO: 1 %
BILIRUB SERPL-MCNC: 0.3 MG/DL (ref 0–1.2)
BILIRUB UR QL STRIP: NEGATIVE
BUN SERPL-MCNC: 17 MG/DL (ref 6–24)
BUN/CREAT SERPL: 12 (ref 9–20)
CALCIUM SERPL-MCNC: 9.5 MG/DL (ref 8.7–10.2)
CHLORIDE SERPL-SCNC: 105 MMOL/L (ref 96–106)
CO2 SERPL-SCNC: 23 MMOL/L (ref 20–29)
COLOR UR: YELLOW
CREAT SERPL-MCNC: 1.42 MG/DL (ref 0.76–1.27)
EOSINOPHIL # BLD AUTO: 0.2 X10E3/UL (ref 0–0.4)
EOSINOPHIL NFR BLD AUTO: 3 %
ERYTHROCYTE [DISTWIDTH] IN BLOOD BY AUTOMATED COUNT: 14.6 % (ref 11.6–15.4)
GLOBULIN SER CALC-MCNC: 2.7 G/DL (ref 1.5–4.5)
GLUCOSE SERPL-MCNC: 105 MG/DL (ref 65–99)
GLUCOSE UR QL: NEGATIVE
HCT VFR BLD AUTO: 42.8 % (ref 37.5–51)
HGB BLD-MCNC: 14.5 G/DL (ref 13–17.7)
HGB UR QL STRIP: NEGATIVE
IMM GRANULOCYTES # BLD AUTO: 0 X10E3/UL (ref 0–0.1)
IMM GRANULOCYTES NFR BLD AUTO: 0 %
KETONES UR QL STRIP: NEGATIVE
LEUKOCYTE ESTERASE UR QL STRIP: NEGATIVE
LYMPHOCYTES # BLD AUTO: 2.2 X10E3/UL (ref 0.7–3.1)
LYMPHOCYTES NFR BLD AUTO: 43 %
MCH RBC QN AUTO: 29.2 PG (ref 26.6–33)
MCHC RBC AUTO-ENTMCNC: 33.9 G/DL (ref 31.5–35.7)
MCV RBC AUTO: 86 FL (ref 79–97)
MICRO URNS: NORMAL
MONOCYTES # BLD AUTO: 0.4 X10E3/UL (ref 0.1–0.9)
MONOCYTES NFR BLD AUTO: 7 %
NEUTROPHILS # BLD AUTO: 2.3 X10E3/UL (ref 1.4–7)
NEUTROPHILS NFR BLD AUTO: 46 %
NITRITE UR QL STRIP: NEGATIVE
PH UR STRIP: 5 [PH] (ref 5–7.5)
PLATELET # BLD AUTO: 202 X10E3/UL (ref 150–450)
POTASSIUM SERPL-SCNC: 4.2 MMOL/L (ref 3.5–5.2)
PROT SERPL-MCNC: 7.2 G/DL (ref 6–8.5)
PROT UR QL STRIP: NEGATIVE
PSA SERPL-MCNC: 0.4 NG/ML (ref 0–4)
RBC # BLD AUTO: 4.97 X10E6/UL (ref 4.14–5.8)
SODIUM SERPL-SCNC: 142 MMOL/L (ref 134–144)
SP GR UR: 1.02 (ref 1–1.03)
UROBILINOGEN UR STRIP-MCNC: 0.2 MG/DL (ref 0.2–1)
WBC # BLD AUTO: 5 X10E3/UL (ref 3.4–10.8)

## 2020-05-15 NOTE — PROGRESS NOTES
Call- Labs look great overall except for 2 problems. 1. There is slight elevation of average blood sugar based on the A1c measurement. This can be controlled / improved by staying active and watching the diet for concentrated sweets and excessive starchy carbohydrates. 2. Kidney function based on creatinine is worse. This may be from not enough liquids prior to test, but we need to make sure it improves. 3. Mikaela in 3 mos with ov- mail lab, bmp, A1c- dx - ifg, elevated creatinine .  Do test a few days prior to visit

## 2020-05-19 ENCOUNTER — TELEPHONE (OUTPATIENT)
Dept: INTERNAL MEDICINE CLINIC | Age: 50
End: 2020-05-19

## 2020-05-19 DIAGNOSIS — R73.01 IFG (IMPAIRED FASTING GLUCOSE): ICD-10-CM

## 2020-05-19 DIAGNOSIS — R79.89 ELEVATED SERUM CREATININE: Primary | ICD-10-CM

## 2020-05-19 NOTE — TELEPHONE ENCOUNTER
Advised pt MD recommends he schedule tele med visit for further evaluation. Scheduled for tomorrow 5/20/20 at 3 pm. Advised if symptoms worsened prior to appt - increased blood in stool, abdominal pain or fever he should go to ER.

## 2020-05-19 NOTE — TELEPHONE ENCOUNTER
Spoke with pt - MD wanted to know how much blood he noticed. He states it was dark red blood clotted in his stool and also had some run down his leg.  Will forward to MD.

## 2020-05-19 NOTE — PROGRESS NOTES
Advised pt his labs look great overall except for 2 problems:   1. There is slight elevation of average blood sugar based on the A1c measurement. This can be controlled / improved by staying active and watching the diet for concentrated sweets and excessive starchy carbohydrates.     2. Kidney function based on creatinine is worse. This may be from not enough liquids prior to test, but will need to make sure it improves. 3. Recheck in 3 months with ov - scheduled for 9/19/20 at 1:20 pm. Also mailed lab - do test a few days prior to visit.

## 2020-05-19 NOTE — TELEPHONE ENCOUNTER
Spoke with pt - states he has noticed blood in stool 2 times this am. Denies any abd pain, nausea/diarrhea or fever. Wants to know if he should schedule appt or go to ER?  Will forward to MD.

## 2020-05-20 ENCOUNTER — VIRTUAL VISIT (OUTPATIENT)
Dept: INTERNAL MEDICINE CLINIC | Age: 50
End: 2020-05-20

## 2020-05-20 VITALS — SYSTOLIC BLOOD PRESSURE: 129 MMHG | DIASTOLIC BLOOD PRESSURE: 84 MMHG

## 2020-05-20 DIAGNOSIS — K64.9 BLEEDING HEMORRHOID: ICD-10-CM

## 2020-05-20 DIAGNOSIS — K62.5 RECTAL BLEED: Primary | ICD-10-CM

## 2020-05-20 RX ORDER — HYDROCORTISONE 25 MG/G
CREAM TOPICAL 4 TIMES DAILY
Qty: 30 G | Refills: 0 | Status: SHIPPED | OUTPATIENT
Start: 2020-05-20

## 2020-05-21 ENCOUNTER — TELEPHONE (OUTPATIENT)
Dept: INTERNAL MEDICINE CLINIC | Age: 50
End: 2020-05-21

## 2020-05-21 LAB
HBA1C MFR BLD: 5.8 % (ref 4.8–5.6)
SPECIMEN STATUS REPORT, ROLRST: NORMAL

## 2020-05-21 NOTE — TELEPHONE ENCOUNTER
Called Dr Stevie Loomis's office - spoke with Ron Cordon. Advised her pt needs an ASAP appt for rectal bleeding. Pt has been seen by MD in past. She states she will send message to his staff to call pt to schedule appt. Asked if they would call our office to let us know as well. Called pt - left detailed message to expect call from Dr Claude Munoz office for an appt.

## 2020-06-02 ENCOUNTER — TELEPHONE (OUTPATIENT)
Dept: INTERNAL MEDICINE CLINIC | Age: 50
End: 2020-06-02

## 2020-06-02 NOTE — TELEPHONE ENCOUNTER
----- Message from Mina Lesches sent at 6/1/2020  5:27 PM EDT -----  Regarding: Dr. Rizo Parents (if not patient):      Relationship of caller (if not patient):      Best contact number(s):[phone]      Name of medication and dosage if known: for acid reflux liquid form      Is patient out of this medication (yes/no):yes      Pharmacy name:[facility] on [address] listed in chart? (yes/no):yes  Pharmacy phone number:      Details to clarify the request: refill of the acid reflux medication      Mina Lesches

## 2020-06-02 NOTE — TELEPHONE ENCOUNTER
Spoke with pharmacist at [facility] - pt wanted Protonix in powder form but insurance did not cover. Pt has already picked up Rx tablet form. He thinks this may be old message.

## 2020-06-02 NOTE — TELEPHONE ENCOUNTER
----- Message from Rachel Salazar sent at 6/1/2020  5:27 PM EDT -----  Regarding: Dr. Alonzo Wright (if not patient):      Relationship of caller (if not patient):      Best contact number(s):[phone]      Name of medication and dosage if known: for acid reflux liquid form      Is patient out of this medication (yes/no):yes      Pharmacy name:[facility] on [address] listed in chart? (yes/no):yes  Pharmacy phone number:      Details to clarify the request: refill of the acid reflux medication      Rachel Salazar

## 2020-06-17 RX ORDER — RIVAROXABAN 20 MG/1
TABLET, FILM COATED ORAL
Qty: 30 TAB | Refills: 11 | Status: SHIPPED | OUTPATIENT
Start: 2020-06-17 | End: 2020-08-19 | Stop reason: ALTCHOICE

## 2020-07-08 ENCOUNTER — TELEPHONE (OUTPATIENT)
Dept: INTERNAL MEDICINE CLINIC | Age: 50
End: 2020-07-08

## 2020-08-19 ENCOUNTER — OFFICE VISIT (OUTPATIENT)
Dept: INTERNAL MEDICINE CLINIC | Age: 50
End: 2020-08-19
Payer: COMMERCIAL

## 2020-08-19 VITALS
RESPIRATION RATE: 16 BRPM | TEMPERATURE: 98 F | HEIGHT: 67 IN | SYSTOLIC BLOOD PRESSURE: 113 MMHG | WEIGHT: 152 LBS | OXYGEN SATURATION: 98 % | DIASTOLIC BLOOD PRESSURE: 77 MMHG | HEART RATE: 71 BPM | BODY MASS INDEX: 23.86 KG/M2

## 2020-08-19 DIAGNOSIS — I26.99 OTHER ACUTE PULMONARY EMBOLISM WITHOUT ACUTE COR PULMONALE (HCC): Primary | ICD-10-CM

## 2020-08-19 DIAGNOSIS — M25.562 CHRONIC PAIN OF LEFT KNEE: ICD-10-CM

## 2020-08-19 DIAGNOSIS — K64.9 BLEEDING HEMORRHOID: ICD-10-CM

## 2020-08-19 DIAGNOSIS — G89.29 CHRONIC PAIN OF LEFT KNEE: ICD-10-CM

## 2020-08-19 DIAGNOSIS — F33.42 RECURRENT MAJOR DEPRESSIVE DISORDER, IN FULL REMISSION (HCC): ICD-10-CM

## 2020-08-19 PROCEDURE — 99214 OFFICE O/P EST MOD 30 MIN: CPT | Performed by: INTERNAL MEDICINE

## 2020-08-19 NOTE — PROGRESS NOTES
HISTORY OF PRESENT ILLNESS  Irma Sanchez is a 45 y.o. male. HPI Subjective: Abhilash Soto is seen today for follow up of pulmonary embolism and other problems. 1. Hemorrhoids, much improved after procedure with his gastroenterologist.    2. Pulmonary embolism, asymptomatic. In review of his records, I feel this was a provoked PE. Therefore, he should be able to discontinue anticoagulation. It has been in excess of one year of being on the blood thinner. He agrees. We will check a D-dimer in one month to help guide further treatment. 3. Depression. Follows up with psychiatry as directed. Family History:  Negative for any clotting problems. He tells me he does have a sister with breast cancer. Review of Systems:  Notable for some left knee locking. This poses some problems as he has to be on a ladder at work. We will refer to ortho as he may have arthritis or meniscus disease. Preventive Medicine:  CPE six months. Review of Systems   Constitutional: Negative for chills, fever and weight loss. Respiratory: Negative. Cardiovascular: Negative for chest pain, palpitations, leg swelling and PND. Musculoskeletal: Positive for joint pain. Negative for myalgias. Neurological: Negative for focal weakness. Endo/Heme/Allergies: Does not bruise/bleed easily. Physical Exam  Vitals signs and nursing note reviewed. Constitutional:       General: He is not in acute distress. Neck:      Vascular: No carotid bruit. Cardiovascular:      Rate and Rhythm: Normal rate and regular rhythm. Heart sounds: No murmur. No friction rub. No gallop. Pulmonary:      Effort: Pulmonary effort is normal. No respiratory distress. Breath sounds: Normal breath sounds. ASSESSMENT and PLAN  Diagnoses and all orders for this visit:    1. Other acute pulmonary embolism without acute cor pulmonale (HCC)  -     D DIMER    2. Bleeding hemorrhoid- See gastroenterologist as directed     3. Recurrent major depressive disorder, in full remission [facility])- See psychiatrist as directed     4.  Chronic pain of left knee  -     REFERRAL TO ORTHOPEDICS

## 2020-08-31 RX ORDER — LANOLIN ALCOHOL/MO/W.PET/CERES
CREAM (GRAM) TOPICAL
Qty: 30 TAB | Refills: 5 | OUTPATIENT
Start: 2020-08-31

## 2021-01-06 ENCOUNTER — TELEPHONE (OUTPATIENT)
Dept: INTERNAL MEDICINE CLINIC | Age: 51
End: 2021-01-06

## 2021-01-06 NOTE — TELEPHONE ENCOUNTER
Patient's wife asked if Julian Pino could give her a call back -- has a couple of questions to ask about her .

## 2021-01-06 NOTE — TELEPHONE ENCOUNTER
Spoke with pt's wife Shruti De Oliveira MD wanted to thank her  for the input. MD said pt should keep the physical but wants him to come in sooner to address the psychiatric issues. He strongly advise this. She states  She will schedule it and pt will come in. Advised her schedulers gone for the day. Will forward to [facility] to call in morning to speak with wife to schedule 30 min appt to discuss medications.

## 2021-01-06 NOTE — TELEPHONE ENCOUNTER
Spoke with pt's wife Christina Underwood (on HIPAA). She was calling to get pt scheduled for an appt - 2/2/21 for CPE. She also wanted to let Dr Milagros Cox know her concerns she had about pt. He had stopped counseling. He has extreme \"mood swings - all over the place. \" He complains about not being able to sleep because his mind is always running. Plus he is out of his Rx Remeron. He is still taking Prozac. He is under a lot of stress. Has started to drink beer more - from 2 beers to 5 per day. She would like MD to encourage pt to go back to counseling. She would even go with pt if it would help. But she is worried about him.  Will forward to MD.

## 2021-01-11 ENCOUNTER — OFFICE VISIT (OUTPATIENT)
Dept: INTERNAL MEDICINE CLINIC | Age: 51
End: 2021-01-11
Payer: COMMERCIAL

## 2021-01-11 VITALS
BODY MASS INDEX: 24.08 KG/M2 | OXYGEN SATURATION: 98 % | DIASTOLIC BLOOD PRESSURE: 77 MMHG | SYSTOLIC BLOOD PRESSURE: 118 MMHG | WEIGHT: 153.4 LBS | RESPIRATION RATE: 20 BRPM | TEMPERATURE: 98 F | HEIGHT: 67 IN | HEART RATE: 88 BPM

## 2021-01-11 DIAGNOSIS — Z12.5 SCREENING FOR PROSTATE CANCER: ICD-10-CM

## 2021-01-11 DIAGNOSIS — R09.81 NASAL CONGESTION: ICD-10-CM

## 2021-01-11 DIAGNOSIS — Z00.00 ROUTINE GENERAL MEDICAL EXAMINATION AT A HEALTH CARE FACILITY: ICD-10-CM

## 2021-01-11 DIAGNOSIS — F33.42 RECURRENT MAJOR DEPRESSIVE DISORDER, IN FULL REMISSION (HCC): Primary | ICD-10-CM

## 2021-01-11 DIAGNOSIS — R10.13 EPIGASTRIC PAIN: ICD-10-CM

## 2021-01-11 PROCEDURE — 99214 OFFICE O/P EST MOD 30 MIN: CPT | Performed by: INTERNAL MEDICINE

## 2021-01-11 RX ORDER — FLUTICASONE PROPIONATE 50 MCG
2 SPRAY, SUSPENSION (ML) NASAL DAILY
Qty: 1 BOTTLE | Refills: 5
Start: 2021-01-11 | End: 2021-09-28 | Stop reason: ALTCHOICE

## 2021-01-11 NOTE — LETTER
1/11/2021 12:20 PM 
 
Mr. Sachin Eubanks [address] [place] [id number] Please excuse from work, office visit today. Sincerely, Marco Harden MD

## 2021-01-11 NOTE — PROGRESS NOTES
HISTORY OF PRESENT ILLNESS  Jorje Macdonald is a 45 y.o. male. HPI  Assessment. Jaime Tapia is seen today for follow up of medication management for depression and other concerns. His wife had called earlier in the week and indicated he was having some more depression, drinking more and not taking his Remeron. 1. Depression. He notes he is having some depression symptoms in the setting of stress. He is drinking more between 4-6 beers per night. I strongly encouraged that he discontinue this as he has had difficulty with GI bleeding in the past. He stays compliant with his medication. He is not following up with psychiatry. 2. Preventive medicine. Physical is scheduled for 2/2/21. Review of Systems. 1. Notable for bilateral upper extremity paresthesias consistent with carpal tunnel syndrome. We will refer to ortho. 2. He has mild nausea and diarrhea. 3. Lastly, he has decreased symptom of smell. He has no other symptoms. Plan. 1. We have advised Flonase and possible ENT consultation for his chronic congestion. 2. We will increase Protonix to bid. 3. Lastly, we advised decreasing alcohol and restarting follow up with his psychiatrist.       Review of Systems   Constitutional: Negative for weight loss. Respiratory: Negative. Cardiovascular: Negative for chest pain, palpitations, leg swelling and PND. Musculoskeletal: Negative for myalgias. Neurological: Negative for focal weakness. Psychiatric/Behavioral: Positive for depression. Physical Exam  Vitals signs and nursing note reviewed. Constitutional:       General: He is not in acute distress. Appearance: He is not ill-appearing. Skin:     General: Skin is warm and dry. Neurological:      Mental Status: He is alert. Psychiatric:         Behavior: Behavior normal.         ASSESSMENT and PLAN  Diagnoses and all orders for this visit:    1. Recurrent major depressive disorder, in full remission ([facility] [address]    2.  Routine general medical examination at a health care facility  -     CBC WITH AUTOMATED DIFF; Future  -     METABOLIC PANEL, COMPREHENSIVE; Future  -     LIPID PANEL; Future  -     TSH 3RD GENERATION; Future  -     URINALYSIS W/ RFLX MICROSCOPIC; Future    3. Screening for prostate cancer  -     PSA SCREENING (SCREENING); Future    4. Nasal congestion  -     fluticasone propionate (FLONASE) 50 mcg/actuation nasal spray; 2 Sprays by Both Nostrils route daily.     5. Epigastric pain

## 2021-01-12 ENCOUNTER — TELEPHONE (OUTPATIENT)
Dept: INTERNAL MEDICINE CLINIC | Age: 51
End: 2021-01-12

## 2021-01-12 NOTE — TELEPHONE ENCOUNTER
Pharmacy is requesting prescriptions for : Lidocaine/Tetracine 7% Cream ( Quantity : 240 gms )       Also :     Triamcinolone Acetonide 0.147mg/g spray ( Quantity : 400 grams )           Pharmacy Services [facility], [address] [place] [address]  [phone]

## 2021-01-20 DIAGNOSIS — M25.50 GENERALIZED JOINT PAIN: ICD-10-CM

## 2021-01-20 RX ORDER — DICLOFENAC SODIUM 10 MG/G
2 GEL TOPICAL
Qty: 100 G | Refills: 5 | Status: CANCELLED | OUTPATIENT
Start: 2021-01-20

## 2021-02-02 ENCOUNTER — OFFICE VISIT (OUTPATIENT)
Dept: INTERNAL MEDICINE CLINIC | Age: 51
End: 2021-02-02
Payer: COMMERCIAL

## 2021-02-02 VITALS
HEIGHT: 67 IN | SYSTOLIC BLOOD PRESSURE: 117 MMHG | RESPIRATION RATE: 20 BRPM | TEMPERATURE: 97.1 F | OXYGEN SATURATION: 99 % | WEIGHT: 153 LBS | HEART RATE: 85 BPM | DIASTOLIC BLOOD PRESSURE: 76 MMHG | BODY MASS INDEX: 24.01 KG/M2

## 2021-02-02 DIAGNOSIS — Z12.5 SCREENING FOR PROSTATE CANCER: ICD-10-CM

## 2021-02-02 DIAGNOSIS — K40.90 RIGHT INGUINAL HERNIA: ICD-10-CM

## 2021-02-02 DIAGNOSIS — Z00.00 ROUTINE GENERAL MEDICAL EXAMINATION AT A HEALTH CARE FACILITY: Primary | ICD-10-CM

## 2021-02-02 DIAGNOSIS — F33.42 RECURRENT MAJOR DEPRESSIVE DISORDER, IN FULL REMISSION (HCC): ICD-10-CM

## 2021-02-02 PROCEDURE — 99396 PREV VISIT EST AGE 40-64: CPT | Performed by: INTERNAL MEDICINE

## 2021-02-02 NOTE — PROGRESS NOTES
HISTORY OF PRESENT ILLNESS Isabela Lal is a 45 y.o. male. HPI  Dictation on: 02/07/2021  3:45 PM by: Case Tejada [[id number]] We counseled regarding healthy lifestyle issues including diet, exercise and stress management. Family history, social history, etc. Are reviewed and updated, see electronic record. Review of Systems Constitutional: Negative for weight loss. HENT: Negative for sore throat. Respiratory: Negative. Cardiovascular: Negative for chest pain, palpitations, leg swelling and PND. Gastrointestinal: Negative. Genitourinary: Negative. Musculoskeletal: Positive for joint pain. Negative for myalgias. Neurological: Positive for tingling and sensory change. Negative for focal weakness. Physical Exam 
Vitals signs and nursing note reviewed. Constitutional:   
   General: He is not in acute distress. Appearance: He is well-developed. HENT:  
   Head: Normocephalic and atraumatic. Right Ear: Tympanic membrane, ear canal and external ear normal.  
   Left Ear: Tympanic membrane, ear canal and external ear normal.  
Eyes:  
   General:     
   Right eye: No discharge. Left eye: No discharge. Pupils: Pupils are equal, round, and reactive to light. Neck: Musculoskeletal: Normal range of motion and neck supple. Thyroid: No thyromegaly. Vascular: No carotid bruit. Cardiovascular:  
   Rate and Rhythm: Normal rate and regular rhythm. Heart sounds: Normal heart sounds. No murmur. No friction rub. No gallop. Pulmonary:  
   Effort: Pulmonary effort is normal. No respiratory distress. Breath sounds: Normal breath sounds. No wheezing or rales. Abdominal:  
   General: Bowel sounds are normal. There is no distension. Palpations: Abdomen is soft. There is no mass. Tenderness: There is no abdominal tenderness. There is no guarding or rebound. Hernia: A hernia is present.  Hernia is present in the right inguinal area. Musculoskeletal: Normal range of motion. General: No tenderness. Lymphadenopathy:  
   Cervical: No cervical adenopathy. Skin: 
   General: Skin is warm and dry. Findings: No rash. Neurological:  
   Mental Status: He is alert and oriented to person, place, and time. Deep Tendon Reflexes: Reflexes are normal and symmetric. Psychiatric:     
   Behavior: Behavior normal.  
 
 
 
ASSESSMENT and PLAN Diagnoses and all orders for this visit: 1. Routine general medical examination at a health care facility 2. Screening for prostate cancer -    Check labs 3. Recurrent major depressive disorder, in full remission ([facility] [address]- Continue current regimen of prescription and / or OTC medications , See psychiatrist as directed 4. Right inguinal hernia 
-     REFERRAL TO GENERAL SURGERY

## 2021-02-02 NOTE — Clinical Note
HISTORY OF PRESENT ILLNESS Altaf Lugo is a 45 y.o. male. JUANCARLOS Medina is seen today for a complete physical exam and follow up of chronic problems. Preventive Medicine. He is due for labs, shingles vaccine, COVID vaccination and colonoscopy. He is up to date with flu vaccine and a Tdap booster. Chronic Problems Reviewed. He has chronic depression. He has not yet established with a psychiatrist and I strongly urged this. Review of Systems. Notable for some right groin pain for the last week. His exam is consistent with a hernia so we will refer to Dr. Nimco Farnsworth of [facility]. We counseled regarding healthy lifestyle issues including diet, exercise and stress management. Family history, social history, etc. Are reviewed and updated, see electronic record. Review of Systems Constitutional: Negative for weight loss. HENT: Negative for sore throat. Respiratory: Negative. Cardiovascular: Negative for chest pain, palpitations, leg swelling and PND. Gastrointestinal: Negative. Genitourinary: Negative. Musculoskeletal: Positive for joint pain. Negative for myalgias. Neurological: Positive for tingling and sensory change. Negative for focal weakness. Physical Exam 
Vitals signs and nursing note reviewed. Constitutional:   
   General: He is not in acute distress. Appearance: He is well-developed. HENT:  
   Head: Normocephalic and atraumatic. Right Ear: Tympanic membrane, ear canal and external ear normal.  
   Left Ear: Tympanic membrane, ear canal and external ear normal.  
Eyes:  
   General:     
   Right eye: No discharge. Left eye: No discharge. Pupils: Pupils are equal, round, and reactive to light. Neck: Musculoskeletal: Normal range of motion and neck supple. Thyroid: No thyromegaly. Vascular: No carotid bruit. Cardiovascular:  
   Rate and Rhythm: Normal rate and regular rhythm. Heart sounds: Normal heart sounds. No murmur. No friction rub. No gallop. Pulmonary:  
   Effort: Pulmonary effort is normal. No respiratory distress. Breath sounds: Normal breath sounds. No wheezing or rales. Abdominal:  
   General: Bowel sounds are normal. There is no distension. Palpations: Abdomen is soft. There is no mass. Tenderness: There is no abdominal tenderness. There is no guarding or rebound. Hernia: A hernia is present. Hernia is present in the right inguinal area. Musculoskeletal: Normal range of motion. General: No tenderness. Lymphadenopathy:  
   Cervical: No cervical adenopathy. Skin: 
   General: Skin is warm and dry. Findings: No rash. Neurological:  
   Mental Status: He is alert and oriented to person, place, and time. Deep Tendon Reflexes: Reflexes are normal and symmetric. Psychiatric:     
   Behavior: Behavior normal.  
 
 
 
ASSESSMENT and PLAN Diagnoses and all orders for this visit: 1. Routine general medical examination at a health care facility 2. Screening for prostate cancer -    Check labs 3. Recurrent major depressive disorder, in full remission ([facility] [address]- Continue current regimen of prescription and / or OTC medications , See psychiatrist as directed 4. Right inguinal hernia 
-     REFERRAL TO GENERAL SURGERY

## 2021-02-07 NOTE — PROGRESS NOTES
Chacorta Stephenson is seen today for a complete physical exam and follow up of chronic problems. Preventive Medicine. He is due for labs, shingles vaccine, COVID vaccination and colonoscopy. He is up to date with flu vaccine and a Tdap booster. Chronic Problems Reviewed. He has chronic depression. He has not yet established with a psychiatrist and I strongly urged this. Review of Systems. Notable for some right groin pain for the last week. His exam is consistent with a hernia so we will refer to Dr. Imelda Aguilar of [facility].

## 2021-02-24 ENCOUNTER — TELEPHONE (OUTPATIENT)
Dept: INTERNAL MEDICINE CLINIC | Age: 51
End: 2021-02-24

## 2021-02-24 NOTE — TELEPHONE ENCOUNTER
Spoke with pt's wife. She wanted to know if MD would send in refill on pt's Prozac 40 mg? She states the psychdoctor he was seeing has left practice. The new one will not start for 2 months. Advised her MD out of office until 3/1/21. I recommended she call their insurance company to see who is in their network.  Will forward message to MD.

## 2021-02-24 NOTE — TELEPHONE ENCOUNTER
Patient's wife asking for a callback from [place] concerning her 's medication. Needs to ask about whether Dr. Sheridan Andrews would write the prescription for his psych med until the new doctor starts -- his current psych doctor left practice. Also has some questions about his other meds.

## 2021-03-01 DIAGNOSIS — T14.91XA SUICIDE ATTEMPT (HCC): ICD-10-CM

## 2021-03-01 RX ORDER — MIRTAZAPINE 15 MG/1
TABLET, FILM COATED ORAL
Qty: 30 TAB | Refills: 3 | Status: SHIPPED | OUTPATIENT
Start: 2021-03-01 | End: 2021-03-22 | Stop reason: SDUPTHER

## 2021-03-01 RX ORDER — FLUOXETINE HYDROCHLORIDE 40 MG/1
40 CAPSULE ORAL DAILY
Qty: 30 CAP | Refills: 3 | Status: SHIPPED | OUTPATIENT
Start: 2021-03-01 | End: 2021-03-22 | Stop reason: SDUPTHER

## 2021-03-01 NOTE — TELEPHONE ENCOUNTER
Advised pt's wife the followin. MD has sent in the 2 medications. 2. They need to get MD names of psychiatrists covered by their insurance then he can give guidance.

## 2021-03-01 NOTE — TELEPHONE ENCOUNTER
Spoke with pt's wife Jodie Carey. Advised her MD will fill the Prozac for a few months but they should definitely see psychiatrist as directed. She states pt also needs his Mirtazapine 15 mg qhs. She would like MD to refer pt to someone.  Will forward to MD.

## 2021-03-09 ENCOUNTER — OFFICE VISIT (OUTPATIENT)
Dept: SURGERY | Age: 51
End: 2021-03-09
Payer: COMMERCIAL

## 2021-03-09 VITALS
DIASTOLIC BLOOD PRESSURE: 84 MMHG | SYSTOLIC BLOOD PRESSURE: 124 MMHG | WEIGHT: 151 LBS | HEART RATE: 88 BPM | BODY MASS INDEX: 23.7 KG/M2 | RESPIRATION RATE: 18 BRPM | OXYGEN SATURATION: 94 % | HEIGHT: 67 IN | TEMPERATURE: 98.6 F

## 2021-03-09 DIAGNOSIS — R10.31 INGUINODYNIA, RIGHT: Primary | ICD-10-CM

## 2021-03-09 PROCEDURE — 99204 OFFICE O/P NEW MOD 45 MIN: CPT | Performed by: SURGERY

## 2021-03-09 NOTE — PROGRESS NOTES
1. Have you been to the ER, urgent care clinic since your last visit? Hospitalized since your last visit? No    2. Have you seen or consulted any other health care providers outside of the [address] [place] [address] since your last visit? Include any pap smears or colon screening.  No

## 2021-03-09 NOTE — PROGRESS NOTES
General Surgery Office Consultation / H & P    CC: Right groin pain  History of Present Illness:      Mohsen Cortes is a 45 y.o. male who presents with right groin pain intermittently over the last year. Patient has a history in the 90s of a right open inguinal hernia repair followed by a laparoscopic TEP approach by Dr. Crys Calabrese in 2012. His job is physically demanding with the lot of bending and lifting. He reports over the last year intermittent right groin pain that shoots down towards the scrotum. He says that his testicles feel heavy. The pain can be sharp in nature and 6 out of 10. Relaxation helps the pain. Lifting and exertion worsens the pain. Denies any constipation. Denies feeling any masses or bulging unlike before. Has concern that he has a recurrence of his hernia.     Past Medical History:   Diagnosis Date    Colon polyps     Depression     ED (erectile dysfunction)     GERD (gastroesophageal reflux disease)     Hemorrhoids     Other ill-defined conditions(799.89)     Inguinal hernia    Rectal bleeding      Past Surgical History:   Procedure Laterality Date    ENDOSCOPY, COLON, DIAGNOSTIC      09, 10, 14, due 23    HX HERNIA REPAIR      HX HERNIA REPAIR  01/27/12    lap right inguinal hernia repair with mesh by Dr Lucius llamas re-attach      Family History   Problem Relation Age of Onset    Diabetes Mother     Cancer Mother 58        breast    Hypertension Father     Cancer Father         prostate, colon    Breast Cancer Sister     Cancer Sister         breast    Prostate Cancer Maternal Uncle      Social History     Socioeconomic History    Marital status:      Spouse name: Not on file    Number of children: Not on file    Years of education: Not on file    Highest education level: Not on file   Tobacco Use    Smoking status: Never Smoker    Smokeless tobacco: Never Used   Substance and Sexual Activity    Alcohol use: Yes     Alcohol/week: 1.0 standard drinks     Types: 1 Standard drinks or equivalent per week     Comment: 64oz of beer weekly    Drug use: Yes     Types: Marijuana   Social History Narrative    Ynes Augustin, 50 yo -American male is in his second marriage, 2018, to [place] (Rahel), 46, who works at [employer]. He has been in three relationships and one other marriage. He has seven children. He raised two of them, Sunny [place], 29, and Rosemary, 24. [address], 28, Jaime Edmond, who has [place], 25, [place], 22 and [place], 16. He is connected with them all and supportive. 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 Prior to Admission medications    Medication Sig Start Date End Date Taking? Authorizing Provider   FLUoxetine (PROzac) 40 mg capsule Take 1 Cap by mouth daily. 3/1/21  Yes Chioma Craft MD   fluticasone propionate (FLONASE) 50 mcg/actuation nasal spray 2 Sprays by Both Nostrils route daily. 1/11/21  Yes Chioma Craft MD   pantoprazole (PROTONIX) 40 mg tablet TAKE 1 TABLET BY MOUTH TWICE DAILY BEFORE BREAKFAST AND DINNER 5/23/20  Yes Chioma Craft MD   hydrocortisone (ANUSOL-HC) 2.5 % rectal cream Insert  into rectum four (4) times daily. Patient taking differently: Insert  into rectum four (4) times daily. PRN 5/20/20  Yes Chioma Craft MD   multivitamin (ONE A DAY) tablet Take 1 Tab by mouth daily. 5/6/20  Yes Chioma Craft MD   diclofenac (VOLTAREN) 1 % gel Apply 2 g to affected area four (4) times daily as needed for Pain.  5/6/20  Yes Chioma Craft MD   mirtazapine (REMERON) 15 mg tablet TK 1 T PO QHS FOR DEPRESSION 3/1/21   Chioma Craft MD     No Known Allergies    Review of Systems:  Constitutional: No fever or chills  Neurologic: No headache  Eyes: No scleral icterus or irritated eyes  Nose: No nasal pain or drainage  Mouth: No oral lesions or sore throat  Cardiac: No palpations or chest pain  Pulmonary: No cough or shortness or breath  Gastrointestinal: No nausea, emesis, diarrhea, or constipation  Genitourinary: No dysuria  Musculoskeletal: Right inguinodynia  Skin: No rashes or lesions  Psychiatric: No anxiety or depressed mood    Physical Exam:     Visit Vitals  /84 (BP 1 Location: Left arm, BP Patient Position: Sitting, BP Cuff Size: Small adult)   Pulse 88   Temp 98.6 °F (37 °C) (Oral)   Resp 18   Ht 5' 7\" (1.702 m)   Wt 151 lb (68.5 kg)   SpO2 94%   BMI 23.65 kg/m²     General: No acute distress, conversant  Eyes: PERRLA, no scleral icterus  HENT: Normocephalic without oral lesions  Neck: Trachea midline without LAD  Cardiac: Normal pulse rate and rhythm  Pulmonary: Symmetric chest rise with normal effort  GI: Soft, NT, ND, small umbilical hernia, no left or right inguinal hernia palpated. Skin: Warm without rash  Extremities: No edema or joint stiffness  Psych: Appropriate mood and affect    Assessment:     54-year-old male with new onset of right groin inguinodynia status post open and laparoscopic repairs in the 90s and in 2012 without obvious signs of hernia recurrence    Plan:     Unable to palpate hernia recurrence. Ultrasound of the right groin ordered to evaluate for hernia recurrence. Pending results of this we will discuss next steps. He has already exhausted post anterior and posterior approaches and any further intervention would be fraught with additional risk including testicular loss. If no hernia recurrence may need to consider pain specialist.    Total time involved with this patient's care was: 45 minutes, of which >50% of the time was spent counciling the patient.     Signed By: Singh Kam MD  Bariatric and General Surgeon  [facility]    March 9, 2021

## 2021-03-12 ENCOUNTER — HOSPITAL ENCOUNTER (OUTPATIENT)
Dept: ULTRASOUND IMAGING | Age: 51
Discharge: HOME | End: 2021-03-12
Attending: SURGERY
Payer: COMMERCIAL

## 2021-03-12 DIAGNOSIS — R10.31 INGUINODYNIA, RIGHT: ICD-10-CM

## 2021-03-12 PROCEDURE — 76882 US LMTD JT/FCL EVL NVASC XTR: CPT

## 2021-03-15 DIAGNOSIS — R19.09 RIGHT GROIN MASS: Primary | ICD-10-CM

## 2021-03-15 NOTE — PROGRESS NOTES
Enlarged lymph node. Having weight loss, fatigue, night sweats. No hernia found. Spoke with patient. Decided to perform CT abd pelvis to make sure now abdominal LAD.     Alida Edwards MD  Bariatric and General Surgeon  [facility]

## 2021-03-22 ENCOUNTER — TELEPHONE (OUTPATIENT)
Dept: INTERNAL MEDICINE CLINIC | Age: 51
End: 2021-03-22

## 2021-03-22 DIAGNOSIS — T14.91XA SUICIDE ATTEMPT (HCC): ICD-10-CM

## 2021-03-22 RX ORDER — FLUOXETINE HYDROCHLORIDE 40 MG/1
40 CAPSULE ORAL DAILY
Qty: 30 CAP | Refills: 3 | Status: SHIPPED | OUTPATIENT
Start: 2021-03-22 | End: 2021-09-12

## 2021-03-22 RX ORDER — MIRTAZAPINE 15 MG/1
TABLET, FILM COATED ORAL
Qty: 30 TAB | Refills: 3 | Status: SHIPPED | OUTPATIENT
Start: 2021-03-22 | End: 2021-05-26 | Stop reason: SDUPTHER

## 2021-03-22 NOTE — TELEPHONE ENCOUNTER
Trenia Gottron, patient's wife called to check on the 2 scripts, Fluoxetine and Mirtazapine, that were written on 3/1/21 -- they were never sent to [place] [address]. It looks like they were sent to somewhere in [facility]. Wife doesn't know anything about that pharmacy. Asked if you would please re-send the 2 scripts to [facility] listed in pt's chart.

## 2021-03-31 ENCOUNTER — TELEPHONE (OUTPATIENT)
Dept: INTERNAL MEDICINE CLINIC | Age: 51
End: 2021-03-31

## 2021-03-31 NOTE — TELEPHONE ENCOUNTER
Patient is requesting \" Doxepin 5% Topical Cream   Qty : 270gm \"    -   Could not locate on meds list                    [address], [facility] [id number]  [phone]

## 2021-04-15 ENCOUNTER — TELEPHONE (OUTPATIENT)
Dept: SURGERY | Age: 51
End: 2021-04-15

## 2021-04-15 NOTE — TELEPHONE ENCOUNTER
Returned call to Ms Zo Cruz verified [address] [place] [address]. She states patient was in last month MD ordered Ultrasound. The note reads as follows: He has already exhausted post anterior and posterior approaches and any further intervention would be fraught with additional risk including testicular loss. If no hernia recurrence may need to consider pain specialist.    The result of the ultrasound read Right inguinal lymphadenopathy. No hernia is identified. I informed Ms Zo Cruz I will send message to Dr Avel Angelucci and his nurse someone will follow up with her.

## 2021-04-15 NOTE — TELEPHONE ENCOUNTER
Patient's wife called and stated that patient has not received any test results back. They also wanted to know if a follow up appt was needed.

## 2021-05-25 DIAGNOSIS — K21.00 REFLUX ESOPHAGITIS: ICD-10-CM

## 2021-05-26 DIAGNOSIS — T14.91XA SUICIDE ATTEMPT (HCC): ICD-10-CM

## 2021-05-26 RX ORDER — MIRTAZAPINE 15 MG/1
TABLET, FILM COATED ORAL
Qty: 30 TABLET | Refills: 3 | Status: SHIPPED | OUTPATIENT
Start: 2021-05-26 | End: 2021-09-08 | Stop reason: SDUPTHER

## 2021-05-26 RX ORDER — PANTOPRAZOLE SODIUM 40 MG/1
TABLET, DELAYED RELEASE ORAL
Qty: 60 TABLET | Refills: 5 | Status: SHIPPED | OUTPATIENT
Start: 2021-05-26 | End: 2022-03-01

## 2021-05-26 NOTE — TELEPHONE ENCOUNTER
Requested Prescriptions     Pending Prescriptions Disp Refills    mirtazapine (REMERON) 15 mg tablet 30 Tablet 3     Sig: TK 1 T PO QHS FOR DEPRESSION     [id number] - [place] [address]  [facility]  [phone]

## 2021-09-08 DIAGNOSIS — T14.91XA SUICIDE ATTEMPT (HCC): ICD-10-CM

## 2021-09-08 RX ORDER — MIRTAZAPINE 15 MG/1
TABLET, FILM COATED ORAL
Qty: 30 TABLET | Refills: 5 | Status: SHIPPED | OUTPATIENT
Start: 2021-09-08 | End: 2021-09-10 | Stop reason: SDUPTHER

## 2021-09-08 NOTE — TELEPHONE ENCOUNTER
Requested Prescriptions     Pending Prescriptions Disp Refills    mirtazapine (REMERON) 15 mg tablet 30 Tablet 3     Sig: TK 1 T PO QHS FOR DEPRESSION     [id number] [place] AT [address] [place] [address] [id number]  [phone]

## 2021-09-10 DIAGNOSIS — T14.91XA SUICIDE ATTEMPT (HCC): ICD-10-CM

## 2021-09-10 NOTE — TELEPHONE ENCOUNTER
Requested Prescriptions     Pending Prescriptions Disp Refills    mirtazapine (REMERON) 15 mg tablet 30 Tablet 5     Sig: TK 1 T PO QHS FOR DEPRESSION         Requested quantity: 90.0        [id number] [id number]  [phone]

## 2021-09-12 RX ORDER — FLUOXETINE HYDROCHLORIDE 40 MG/1
CAPSULE ORAL
Qty: 30 CAPSULE | Refills: 1 | Status: SHIPPED | OUTPATIENT
Start: 2021-09-12 | End: 2021-11-17 | Stop reason: SDUPTHER

## 2021-09-17 RX ORDER — MIRTAZAPINE 15 MG/1
TABLET, FILM COATED ORAL
Qty: 30 TABLET | Refills: 0 | Status: SHIPPED | OUTPATIENT
Start: 2021-09-17 | End: 2021-11-15 | Stop reason: SDUPTHER

## 2021-09-28 ENCOUNTER — OFFICE VISIT (OUTPATIENT)
Dept: INTERNAL MEDICINE CLINIC | Age: 51
End: 2021-09-28
Payer: COMMERCIAL

## 2021-09-28 VITALS
DIASTOLIC BLOOD PRESSURE: 94 MMHG | OXYGEN SATURATION: 100 % | TEMPERATURE: 97.3 F | HEART RATE: 83 BPM | BODY MASS INDEX: 24.11 KG/M2 | RESPIRATION RATE: 18 BRPM | HEIGHT: 67 IN | WEIGHT: 153.6 LBS | SYSTOLIC BLOOD PRESSURE: 137 MMHG

## 2021-09-28 DIAGNOSIS — R22.2 CHEST WALL MASS: Primary | ICD-10-CM

## 2021-09-28 PROCEDURE — 99214 OFFICE O/P EST MOD 30 MIN: CPT | Performed by: INTERNAL MEDICINE

## 2021-09-28 RX ORDER — METHYLPREDNISOLONE 4 MG/1
TABLET ORAL
COMMUNITY
Start: 2021-05-03

## 2021-09-28 RX ORDER — CYCLOBENZAPRINE HCL 10 MG
10 TABLET ORAL
COMMUNITY
Start: 2021-05-03

## 2021-09-28 RX ORDER — LAMOTRIGINE 25(42)-100
KIT ORAL
COMMUNITY
Start: 2021-08-18

## 2021-09-28 NOTE — PROGRESS NOTES
Chief Complaint   Patient presents with    Cyst     red hard swollen bumps on left hip       1. Have you been to the ER, urgent care clinic since your last visit? Hospitalized since your last visit? Yes When: 09/2021 Where: [facility]  Reason for visit: stool blockage    2. Have you seen or consulted any other health care providers outside of the [address] [place] [address] since your last visit? Include any pap smears or colon screening.  No

## 2021-09-28 NOTE — PROGRESS NOTES
HISTORY OF PRESENT ILLNESS  Baby Adalberto is a 45 y.o. male. Cyst  The history is provided by the patient (He noted swelling and pain around left rib cage, dx with constipation at PF, reports rib xray ok). This is a new problem. Episode onset: 10 d. Pertinent negatives include no shortness of breath. Rash   The history is provided by the patient (bruising at above site). This is a new problem. Associated with: appears to be bruising, possible hematoma. There has been no fever. Associated symptoms include pain. Review of Systems   Constitutional: Negative for chills and fever. Respiratory: Negative for shortness of breath. Musculoskeletal: Positive for back pain. Skin: Positive for rash. Endo/Heme/Allergies: Bruises/bleeds easily. Physical Exam  Vitals and nursing note reviewed. Pulmonary:       Lymphadenopathy:      Lower Body: No right inguinal adenopathy. No left inguinal adenopathy. Skin:     General: Skin is warm and dry. Neurological:      Mental Status: He is alert. Psychiatric:         Behavior: Behavior normal.         ASSESSMENT and PLAN  Diagnoses and all orders for this visit:    1.  Chest wall mass- likely hematoma, need to rule out abscess or other mass  -     US CHEST; Future

## 2021-09-29 ENCOUNTER — HOSPITAL ENCOUNTER (OUTPATIENT)
Dept: ULTRASOUND IMAGING | Age: 51
Discharge: HOME | End: 2021-09-29
Attending: INTERNAL MEDICINE
Payer: COMMERCIAL

## 2021-09-29 DIAGNOSIS — R22.2 CHEST WALL MASS: ICD-10-CM

## 2021-09-29 PROCEDURE — 76604 US EXAM CHEST: CPT

## 2021-10-04 ENCOUNTER — TELEPHONE (OUTPATIENT)
Dept: INTERNAL MEDICINE CLINIC | Age: 51
End: 2021-10-04

## 2021-10-04 DIAGNOSIS — R22.2 MASS OF LEFT CHEST WALL: Primary | ICD-10-CM

## 2021-10-13 ENCOUNTER — HOSPITAL ENCOUNTER (OUTPATIENT)
Dept: MRI IMAGING | Age: 51
Discharge: HOME | End: 2021-10-13
Attending: INTERNAL MEDICINE
Payer: COMMERCIAL

## 2021-10-13 DIAGNOSIS — R22.2 MASS OF LEFT CHEST WALL: ICD-10-CM

## 2021-10-13 PROCEDURE — 74011250636 HC RX REV CODE- 250/636

## 2021-10-13 PROCEDURE — A9576 INJ PROHANCE MULTIPACK: HCPCS

## 2021-10-13 PROCEDURE — 71552 MRI CHEST W/O & W/DYE: CPT

## 2021-10-13 RX ADMIN — GADOTERIDOL 15 ML: 279.3 INJECTION, SOLUTION INTRAVENOUS at 19:29

## 2021-10-14 NOTE — PROGRESS NOTES
Call- the area is not a tumor, most likely is a contusion/bruise, although could be a muscle tear. No further testing is needed and it should resolve.

## 2021-10-14 NOTE — PROGRESS NOTES
Advised pt the area is not a tumor - most likely is a contusion/bruise, although could be a muscle tear. No further testing is needed and it should resolve.

## 2021-11-15 DIAGNOSIS — T14.91XA SUICIDE ATTEMPT (HCC): ICD-10-CM

## 2021-11-15 RX ORDER — MIRTAZAPINE 15 MG/1
TABLET, FILM COATED ORAL
Qty: 90 TABLET | Refills: 0 | Status: SHIPPED | OUTPATIENT
Start: 2021-11-15 | End: 2022-02-18

## 2021-11-15 NOTE — TELEPHONE ENCOUNTER
Requested Prescriptions     Pending Prescriptions Disp Refills    mirtazapine (REMERON) 15 mg tablet 30 Tablet 0     Sig: TK 1 T PO QHS FOR DEPRESSION       Requested Quantity : 90.0      [id number] [place] [facility] [address] [facility] AT [address] [place] [address] [id number]  [phone]

## 2021-11-17 RX ORDER — FLUOXETINE HYDROCHLORIDE 40 MG/1
CAPSULE ORAL
Qty: 30 CAPSULE | Refills: 1 | Status: SHIPPED | OUTPATIENT
Start: 2021-11-17 | End: 2021-12-17 | Stop reason: SDUPTHER

## 2021-12-17 NOTE — TELEPHONE ENCOUNTER
Requested Prescriptions     Pending Prescriptions Disp Refills    FLUoxetine (PROzac) 40 mg capsule 30 Capsule 1     Sig: Take 1 Capsule by mouth daily.              Requested Quantity : 90.0          [facility] #[id number] [id number]  [phone]

## 2021-12-20 RX ORDER — FLUOXETINE HYDROCHLORIDE 40 MG/1
40 CAPSULE ORAL DAILY
Qty: 90 CAPSULE | Refills: 0 | Status: SHIPPED | OUTPATIENT
Start: 2021-12-20 | End: 2022-05-18 | Stop reason: SDUPTHER

## 2022-02-15 DIAGNOSIS — T14.91XA SUICIDE ATTEMPT (HCC): ICD-10-CM

## 2022-02-18 RX ORDER — MIRTAZAPINE 15 MG/1
TABLET, FILM COATED ORAL
Qty: 90 TABLET | Refills: 0 | Status: SHIPPED | OUTPATIENT
Start: 2022-02-18 | End: 2022-05-18 | Stop reason: SDUPTHER

## 2022-03-01 ENCOUNTER — TELEPHONE (OUTPATIENT)
Dept: INTERNAL MEDICINE CLINIC | Age: 52
End: 2022-03-01

## 2022-03-01 RX ORDER — OMEPRAZOLE 40 MG/1
40 CAPSULE, DELAYED RELEASE ORAL DAILY
Qty: 90 CAPSULE | Refills: 0 | Status: SHIPPED | OUTPATIENT
Start: 2022-03-01 | End: 2022-05-26 | Stop reason: SDUPTHER

## 2022-03-01 NOTE — TELEPHONE ENCOUNTER
Reason for call:  His med Pantoprazole mg was changed and the price has changed from $10.00 to 75.00. Please change the mg or to a 90 day refill.     Is this a new problem: yes     Date of last appointment:  9/28/2021     Can we respond via [employer]: no    Best call back number:  [phone]

## 2022-03-01 NOTE — TELEPHONE ENCOUNTER
Spoke with pt's wife Mary Alice David (on HIPAA). Advised her MD has changed pt to omeprazole 40 mg which should be cheaper. MD has sent to his pharmacy. Also advised her about [facility].

## 2022-03-18 PROBLEM — M25.50 GENERALIZED JOINT PAIN: Status: ACTIVE | Noted: 2018-04-18

## 2022-03-19 PROBLEM — R10.31 INGUINODYNIA, RIGHT: Status: ACTIVE | Noted: 2021-03-09

## 2022-05-18 DIAGNOSIS — T14.91XA SUICIDE ATTEMPT (HCC): ICD-10-CM

## 2022-05-18 RX ORDER — FLUOXETINE HYDROCHLORIDE 40 MG/1
CAPSULE ORAL
Qty: 90 CAPSULE | Refills: 0 | Status: SHIPPED | OUTPATIENT
Start: 2022-05-18

## 2022-05-18 RX ORDER — MIRTAZAPINE 15 MG/1
TABLET, FILM COATED ORAL
Qty: 90 TABLET | Refills: 0 | Status: SHIPPED | OUTPATIENT
Start: 2022-05-18

## 2022-05-26 RX ORDER — OMEPRAZOLE 40 MG/1
40 CAPSULE, DELAYED RELEASE ORAL DAILY
Qty: 90 CAPSULE | Refills: 0 | Status: SHIPPED | OUTPATIENT
Start: 2022-05-26

## 2025-07-17 ENCOUNTER — APPOINTMENT (OUTPATIENT)
Facility: HOSPITAL | Age: 55
End: 2025-07-17
Payer: COMMERCIAL

## 2025-07-17 ENCOUNTER — HOSPITAL ENCOUNTER (EMERGENCY)
Facility: HOSPITAL | Age: 55
Discharge: HOME | End: 2025-07-17
Attending: EMERGENCY MEDICINE
Payer: COMMERCIAL

## 2025-07-17 VITALS
WEIGHT: 150 LBS | DIASTOLIC BLOOD PRESSURE: 99 MMHG | TEMPERATURE: 98.4 F | OXYGEN SATURATION: 97 % | RESPIRATION RATE: 15 BRPM | SYSTOLIC BLOOD PRESSURE: 167 MMHG | HEIGHT: 67 IN | BODY MASS INDEX: 23.54 KG/M2 | HEART RATE: 65 BPM

## 2025-07-17 DIAGNOSIS — I10 ESSENTIAL HYPERTENSION: ICD-10-CM

## 2025-07-17 DIAGNOSIS — R42 DIZZINESS: Primary | ICD-10-CM

## 2025-07-17 DIAGNOSIS — G44.89 OTHER HEADACHE SYNDROME: ICD-10-CM

## 2025-07-17 LAB
ALBUMIN SERPL-MCNC: 4 G/DL (ref 3.5–5)
ALBUMIN/GLOB SERPL: 1.1 (ref 1.1–2.2)
ALP SERPL-CCNC: 55 U/L (ref 45–117)
ALT SERPL-CCNC: 29 U/L (ref 12–78)
ANION GAP SERPL CALC-SCNC: 6 MMOL/L (ref 2–12)
AST SERPL-CCNC: 23 U/L (ref 15–37)
BASOPHILS # BLD: 0.02 K/UL (ref 0–0.1)
BASOPHILS NFR BLD: 0.4 % (ref 0–1)
BILIRUB SERPL-MCNC: 0.6 MG/DL (ref 0.2–1)
BUN SERPL-MCNC: 9 MG/DL (ref 6–20)
BUN/CREAT SERPL: 9 (ref 12–20)
CALCIUM SERPL-MCNC: 8.7 MG/DL (ref 8.5–10.1)
CHLORIDE SERPL-SCNC: 108 MMOL/L (ref 97–108)
CO2 SERPL-SCNC: 25 MMOL/L (ref 21–32)
COMMENT:: NORMAL
CREAT SERPL-MCNC: 0.99 MG/DL (ref 0.7–1.3)
DIFFERENTIAL METHOD BLD: NORMAL
EKG ATRIAL RATE: 65 BPM
EKG DIAGNOSIS: NORMAL
EKG P AXIS: 61 DEGREES
EKG P-R INTERVAL: 164 MS
EKG Q-T INTERVAL: 382 MS
EKG QRS DURATION: 86 MS
EKG QTC CALCULATION (BAZETT): 397 MS
EKG R AXIS: 42 DEGREES
EKG T AXIS: 55 DEGREES
EKG VENTRICULAR RATE: 65 BPM
EOSINOPHIL # BLD: 0.04 K/UL (ref 0–0.4)
EOSINOPHIL NFR BLD: 0.9 % (ref 0–7)
ERYTHROCYTE [DISTWIDTH] IN BLOOD BY AUTOMATED COUNT: 14.2 % (ref 11.5–14.5)
GLOBULIN SER CALC-MCNC: 3.8 G/DL (ref 2–4)
GLUCOSE SERPL-MCNC: 89 MG/DL (ref 65–100)
HCT VFR BLD AUTO: 41.9 % (ref 36.6–50.3)
HGB BLD-MCNC: 14.3 G/DL (ref 12.1–17)
IMM GRANULOCYTES # BLD AUTO: 0.01 K/UL (ref 0–0.04)
IMM GRANULOCYTES NFR BLD AUTO: 0.2 % (ref 0–0.5)
LYMPHOCYTES # BLD: 1.7 K/UL (ref 0.8–3.5)
LYMPHOCYTES NFR BLD: 36.8 % (ref 12–49)
MCH RBC QN AUTO: 28.9 PG (ref 26–34)
MCHC RBC AUTO-ENTMCNC: 34.1 G/DL (ref 30–36.5)
MCV RBC AUTO: 84.6 FL (ref 80–99)
MONOCYTES # BLD: 0.39 K/UL (ref 0–1)
MONOCYTES NFR BLD: 8.4 % (ref 5–13)
NEUTS SEG # BLD: 2.46 K/UL (ref 1.8–8)
NEUTS SEG NFR BLD: 53.3 % (ref 32–75)
NRBC # BLD: 0 K/UL (ref 0–0.01)
NRBC BLD-RTO: 0 PER 100 WBC
PLATELET # BLD AUTO: 214 K/UL (ref 150–400)
PMV BLD AUTO: 9.4 FL (ref 8.9–12.9)
POTASSIUM SERPL-SCNC: 3.5 MMOL/L (ref 3.5–5.1)
PROT SERPL-MCNC: 7.8 G/DL (ref 6.4–8.2)
RBC # BLD AUTO: 4.95 M/UL (ref 4.1–5.7)
SODIUM SERPL-SCNC: 139 MMOL/L (ref 136–145)
SPECIMEN HOLD: NORMAL
WBC # BLD AUTO: 4.6 K/UL (ref 4.1–11.1)

## 2025-07-17 PROCEDURE — 2580000003 HC RX 258: Performed by: EMERGENCY MEDICINE

## 2025-07-17 PROCEDURE — 6360000004 HC RX CONTRAST MEDICATION: Performed by: RADIOLOGY

## 2025-07-17 PROCEDURE — 93005 ELECTROCARDIOGRAM TRACING: CPT | Performed by: EMERGENCY MEDICINE

## 2025-07-17 PROCEDURE — 96375 TX/PRO/DX INJ NEW DRUG ADDON: CPT

## 2025-07-17 PROCEDURE — 80053 COMPREHEN METABOLIC PANEL: CPT

## 2025-07-17 PROCEDURE — 99285 EMERGENCY DEPT VISIT HI MDM: CPT

## 2025-07-17 PROCEDURE — 85025 COMPLETE CBC W/AUTO DIFF WBC: CPT

## 2025-07-17 PROCEDURE — 70450 CT HEAD/BRAIN W/O DYE: CPT

## 2025-07-17 PROCEDURE — 96374 THER/PROPH/DIAG INJ IV PUSH: CPT

## 2025-07-17 PROCEDURE — 70498 CT ANGIOGRAPHY NECK: CPT

## 2025-07-17 PROCEDURE — 6360000002 HC RX W HCPCS: Performed by: EMERGENCY MEDICINE

## 2025-07-17 RX ORDER — ONDANSETRON 2 MG/ML
4 INJECTION INTRAMUSCULAR; INTRAVENOUS
Status: COMPLETED | OUTPATIENT
Start: 2025-07-17 | End: 2025-07-17

## 2025-07-17 RX ORDER — 0.9 % SODIUM CHLORIDE 0.9 %
1000 INTRAVENOUS SOLUTION INTRAVENOUS ONCE
Status: COMPLETED | OUTPATIENT
Start: 2025-07-17 | End: 2025-07-17

## 2025-07-17 RX ORDER — LABETALOL HYDROCHLORIDE 5 MG/ML
10 INJECTION, SOLUTION INTRAVENOUS ONCE
Status: COMPLETED | OUTPATIENT
Start: 2025-07-17 | End: 2025-07-17

## 2025-07-17 RX ORDER — LISINOPRIL 10 MG/1
10 TABLET ORAL DAILY
Qty: 30 TABLET | Refills: 0 | Status: SHIPPED | OUTPATIENT
Start: 2025-07-17

## 2025-07-17 RX ORDER — KETOROLAC TROMETHAMINE 30 MG/ML
30 INJECTION, SOLUTION INTRAMUSCULAR; INTRAVENOUS
Status: COMPLETED | OUTPATIENT
Start: 2025-07-17 | End: 2025-07-17

## 2025-07-17 RX ORDER — IOPAMIDOL 755 MG/ML
100 INJECTION, SOLUTION INTRAVASCULAR
Status: COMPLETED | OUTPATIENT
Start: 2025-07-17 | End: 2025-07-17

## 2025-07-17 RX ADMIN — ONDANSETRON 4 MG: 2 INJECTION, SOLUTION INTRAMUSCULAR; INTRAVENOUS at 09:35

## 2025-07-17 RX ADMIN — SODIUM CHLORIDE 1000 ML: 0.9 INJECTION, SOLUTION INTRAVENOUS at 09:36

## 2025-07-17 RX ADMIN — LABETALOL HYDROCHLORIDE 10 MG: 5 INJECTION, SOLUTION INTRAVENOUS at 15:03

## 2025-07-17 RX ADMIN — KETOROLAC TROMETHAMINE 30 MG: 30 INJECTION, SOLUTION INTRAMUSCULAR; INTRAVENOUS at 09:34

## 2025-07-17 RX ADMIN — IOPAMIDOL 100 ML: 755 INJECTION, SOLUTION INTRAVENOUS at 12:03

## 2025-07-17 ASSESSMENT — ENCOUNTER SYMPTOMS
STRIDOR: 0
WHEEZING: 0
TROUBLE SWALLOWING: 0
EYES NEGATIVE: 1
NAUSEA: 0
DIARRHEA: 0
SORE THROAT: 0
BLOOD IN STOOL: 0
VOMITING: 0
ABDOMINAL PAIN: 0
SINUS PRESSURE: 0
CHEST TIGHTNESS: 0
COLOR CHANGE: 0
SINUS PAIN: 0
COUGH: 0
SHORTNESS OF BREATH: 0
RHINORRHEA: 0
BACK PAIN: 0

## 2025-07-17 ASSESSMENT — PAIN - FUNCTIONAL ASSESSMENT: PAIN_FUNCTIONAL_ASSESSMENT: 0-10

## 2025-07-17 ASSESSMENT — LIFESTYLE VARIABLES
HOW OFTEN DO YOU HAVE A DRINK CONTAINING ALCOHOL: NEVER
HOW MANY STANDARD DRINKS CONTAINING ALCOHOL DO YOU HAVE ON A TYPICAL DAY: PATIENT DOES NOT DRINK

## 2025-07-17 ASSESSMENT — PAIN SCALES - GENERAL: PAINLEVEL_OUTOF10: 8

## 2025-07-17 ASSESSMENT — PAIN DESCRIPTION - LOCATION: LOCATION: HEAD

## 2025-07-17 NOTE — ED PROVIDER NOTES
[facility] EMERGENCY DEPARTMENT  EMERGENCY DEPARTMENT ENCOUNTER      Pt Name: Suap Kan  MRN: [id number]  Birthdate 1970  Date of evaluation: 7/17/2025  Provider: Jose Luis Marquis MD    CHIEF COMPLAINT       Chief Complaint   Patient presents with    Headache    Dizziness         HISTORY OF PRESENT ILLNESS    This is an 55-year-old male who presents with a history of intermittent headaches which she refers to his migraine headaches.  They usually get better with some ibuprofen.  He has never had dizziness associated with his headache.  He states that he woke up this morning with a headache and some mild dizziness.  It got progressively worse.  He went to work but found that he was unable to function there because of both symptoms.  He had to sit down at 1 point because of his dizziness.  He has not had any visual symptoms and denies any peripheral weakness or numbness.  Patient states that he has not had any stroke.  He does not have any known history of hypertension.  He takes no medications for anything presently.  He denies any chest pain, shortness of breath, abdominal pain or other acute symptomatology.            Review of External Medical Records:     Nursing Notes were reviewed.    REVIEW OF SYSTEMS       Review of Systems   Constitutional:  Negative for activity change, chills, fatigue and fever.   HENT:  Negative for congestion, ear pain, rhinorrhea, sinus pressure, sinus pain, sore throat and trouble swallowing.    Eyes: Negative.    Respiratory:  Negative for cough, chest tightness, shortness of breath, wheezing and stridor.    Cardiovascular:  Negative for chest pain, palpitations and leg swelling.   Gastrointestinal:  Negative for abdominal pain, blood in stool, diarrhea, nausea and vomiting.   Endocrine: Negative.    Genitourinary:  Negative for decreased urine volume, difficulty urinating, flank pain, frequency and hematuria.   Musculoskeletal:  Negative for back pain, joint swelling

## 2025-07-17 NOTE — ED TRIAGE NOTES
Patient arrived via EMS from work c/o headache and dizziness since 0530. Has been hypertensive with EMS, but doesn't have a hx of it. BG 95. States that when he stood up the dizziness was worse, but it is somewhat better now. Headache feels like \"when you are reading something and you are straining your vision\", still 8/10, constant, does not radiate. Does c/o slight pain on left side of chest, 5/10, intermittent, not moving makes it better, does not radiate.

## 2025-07-17 NOTE — DISCHARGE INSTRUCTIONS
Take the medication as directed for blood pressure once a day and follow-up with your own physician in the next week for recheck of the pressure.  Return if any worsening of symptoms